# Patient Record
Sex: FEMALE | Race: WHITE | NOT HISPANIC OR LATINO | Employment: PART TIME | URBAN - METROPOLITAN AREA
[De-identification: names, ages, dates, MRNs, and addresses within clinical notes are randomized per-mention and may not be internally consistent; named-entity substitution may affect disease eponyms.]

---

## 2017-12-17 ENCOUNTER — HOSPITAL ENCOUNTER (INPATIENT)
Facility: HOSPITAL | Age: 82
LOS: 5 days | Discharge: HOME HEALTH | End: 2017-12-22
Attending: EMERGENCY MEDICINE | Admitting: HOSPITALIST

## 2017-12-17 ENCOUNTER — APPOINTMENT (OUTPATIENT)
Dept: GENERAL RADIOLOGY | Facility: HOSPITAL | Age: 82
End: 2017-12-17

## 2017-12-17 DIAGNOSIS — I48.0 PAF (PAROXYSMAL ATRIAL FIBRILLATION) (HCC): ICD-10-CM

## 2017-12-17 DIAGNOSIS — E55.9 VITAMIN D DEFICIENCY: ICD-10-CM

## 2017-12-17 DIAGNOSIS — R53.1 GENERALIZED WEAKNESS: ICD-10-CM

## 2017-12-17 DIAGNOSIS — J44.9 CHRONIC OBSTRUCTIVE PULMONARY DISEASE, UNSPECIFIED COPD TYPE (HCC): ICD-10-CM

## 2017-12-17 DIAGNOSIS — Z74.09 IMPAIRED FUNCTIONAL MOBILITY, BALANCE, GAIT, AND ENDURANCE: ICD-10-CM

## 2017-12-17 DIAGNOSIS — I11.9 HYPERTENSIVE HEART DISEASE WITHOUT HEART FAILURE: ICD-10-CM

## 2017-12-17 DIAGNOSIS — N39.0 URINARY TRACT INFECTION WITHOUT HEMATURIA, SITE UNSPECIFIED: Primary | ICD-10-CM

## 2017-12-17 DIAGNOSIS — D72.829 LEUKOCYTOSIS, UNSPECIFIED TYPE: ICD-10-CM

## 2017-12-17 DIAGNOSIS — J44.1 COPD EXACERBATION (HCC): ICD-10-CM

## 2017-12-17 DIAGNOSIS — N17.9 ACUTE RENAL FAILURE, UNSPECIFIED ACUTE RENAL FAILURE TYPE (HCC): ICD-10-CM

## 2017-12-17 DIAGNOSIS — R06.02 SHORTNESS OF BREATH: ICD-10-CM

## 2017-12-17 PROBLEM — R53.81 MALAISE: Status: ACTIVE | Noted: 2017-12-17

## 2017-12-17 PROBLEM — Z87.891 FORMER SMOKER: Status: ACTIVE | Noted: 2017-12-17

## 2017-12-17 PROBLEM — A41.9 SEPSIS (HCC): Status: ACTIVE | Noted: 2017-12-17

## 2017-12-17 PROBLEM — E11.9 DIABETES MELLITUS (HCC): Status: ACTIVE | Noted: 2017-12-17

## 2017-12-17 PROBLEM — J11.1 INFLUENZA: Status: ACTIVE | Noted: 2017-12-17

## 2017-12-17 PROBLEM — R54 ADVANCED AGE: Status: ACTIVE | Noted: 2017-12-17

## 2017-12-17 LAB
ALBUMIN SERPL-MCNC: 4.1 G/DL (ref 3.2–4.8)
ALBUMIN/GLOB SERPL: 1.6 G/DL (ref 1.5–2.5)
ALP SERPL-CCNC: 56 U/L (ref 25–100)
ALT SERPL W P-5'-P-CCNC: 20 U/L (ref 7–40)
ANION GAP SERPL CALCULATED.3IONS-SCNC: 11 MMOL/L (ref 3–11)
AST SERPL-CCNC: 25 U/L (ref 0–33)
BACTERIA UR QL AUTO: ABNORMAL /HPF
BASOPHILS # BLD AUTO: 0.02 10*3/MM3 (ref 0–0.2)
BASOPHILS NFR BLD AUTO: 0.1 % (ref 0–1)
BILIRUB SERPL-MCNC: 1.1 MG/DL (ref 0.3–1.2)
BILIRUB UR QL STRIP: ABNORMAL
BNP SERPL-MCNC: 318 PG/ML (ref 0–100)
BUN BLD-MCNC: 28 MG/DL (ref 9–23)
BUN/CREAT SERPL: 18.7 (ref 7–25)
CALCIUM SPEC-SCNC: 9.4 MG/DL (ref 8.7–10.4)
CHLORIDE SERPL-SCNC: 96 MMOL/L (ref 99–109)
CLARITY UR: ABNORMAL
CO2 SERPL-SCNC: 26 MMOL/L (ref 20–31)
COLOR UR: ABNORMAL
CREAT BLD-MCNC: 1.5 MG/DL (ref 0.6–1.3)
DEPRECATED RDW RBC AUTO: 51.7 FL (ref 37–54)
EOSINOPHIL # BLD AUTO: 0.01 10*3/MM3 (ref 0–0.3)
EOSINOPHIL NFR BLD AUTO: 0.1 % (ref 0–3)
ERYTHROCYTE [DISTWIDTH] IN BLOOD BY AUTOMATED COUNT: 17 % (ref 11.3–14.5)
FLUAV AG NPH QL: NEGATIVE
FLUBV AG NPH QL IA: NEGATIVE
GFR SERPL CREATININE-BSD FRML MDRD: 33 ML/MIN/1.73
GLOBULIN UR ELPH-MCNC: 2.6 GM/DL
GLUCOSE BLD-MCNC: 149 MG/DL (ref 70–100)
GLUCOSE BLDC GLUCOMTR-MCNC: 221 MG/DL (ref 70–130)
GLUCOSE UR STRIP-MCNC: NEGATIVE MG/DL
HCT VFR BLD AUTO: 38.2 % (ref 34.5–44)
HGB BLD-MCNC: 11.9 G/DL (ref 11.5–15.5)
HGB UR QL STRIP.AUTO: ABNORMAL
HOLD SPECIMEN: NORMAL
HOLD SPECIMEN: NORMAL
HYALINE CASTS UR QL AUTO: ABNORMAL /LPF
IMM GRANULOCYTES # BLD: 0.05 10*3/MM3 (ref 0–0.03)
IMM GRANULOCYTES NFR BLD: 0.3 % (ref 0–0.6)
KETONES UR QL STRIP: ABNORMAL
LEUKOCYTE ESTERASE UR QL STRIP.AUTO: ABNORMAL
LYMPHOCYTES # BLD AUTO: 2.23 10*3/MM3 (ref 0.6–4.8)
LYMPHOCYTES NFR BLD AUTO: 14.2 % (ref 24–44)
MCH RBC QN AUTO: 25.8 PG (ref 27–31)
MCHC RBC AUTO-ENTMCNC: 31.2 G/DL (ref 32–36)
MCV RBC AUTO: 82.7 FL (ref 80–99)
MONOCYTES # BLD AUTO: 0.79 10*3/MM3 (ref 0–1)
MONOCYTES NFR BLD AUTO: 5 % (ref 0–12)
NEUTROPHILS # BLD AUTO: 12.65 10*3/MM3 (ref 1.5–8.3)
NEUTROPHILS NFR BLD AUTO: 80.3 % (ref 41–71)
NITRITE UR QL STRIP: POSITIVE
PH UR STRIP.AUTO: 5.5 [PH] (ref 5–8)
PLATELET # BLD AUTO: 188 10*3/MM3 (ref 150–450)
PMV BLD AUTO: 10 FL (ref 6–12)
POTASSIUM BLD-SCNC: 4.3 MMOL/L (ref 3.5–5.5)
PROT SERPL-MCNC: 6.7 G/DL (ref 5.7–8.2)
PROT UR QL STRIP: ABNORMAL
RBC # BLD AUTO: 4.62 10*6/MM3 (ref 3.89–5.14)
RBC # UR: ABNORMAL /HPF
REF LAB TEST METHOD: ABNORMAL
SODIUM BLD-SCNC: 133 MMOL/L (ref 132–146)
SP GR UR STRIP: 1.02 (ref 1–1.03)
SQUAMOUS #/AREA URNS HPF: ABNORMAL /HPF
TROPONIN I SERPL-MCNC: 0.03 NG/ML (ref 0–0.07)
UROBILINOGEN UR QL STRIP: ABNORMAL
WBC NRBC COR # BLD: 15.75 10*3/MM3 (ref 3.5–10.8)
WBC UR QL AUTO: ABNORMAL /HPF
WHOLE BLOOD HOLD SPECIMEN: NORMAL
WHOLE BLOOD HOLD SPECIMEN: NORMAL
YEAST URNS QL MICRO: ABNORMAL /HPF

## 2017-12-17 PROCEDURE — 63710000001 INSULIN LISPRO (HUMAN) PER 5 UNITS: Performed by: FAMILY MEDICINE

## 2017-12-17 PROCEDURE — 80053 COMPREHEN METABOLIC PANEL: CPT | Performed by: EMERGENCY MEDICINE

## 2017-12-17 PROCEDURE — 81001 URINALYSIS AUTO W/SCOPE: CPT | Performed by: PHYSICIAN ASSISTANT

## 2017-12-17 PROCEDURE — 25010000002 METHYLPREDNISOLONE PER 125 MG: Performed by: FAMILY MEDICINE

## 2017-12-17 PROCEDURE — 99223 1ST HOSP IP/OBS HIGH 75: CPT | Performed by: FAMILY MEDICINE

## 2017-12-17 PROCEDURE — 25010000002 METHYLPREDNISOLONE PER 40 MG: Performed by: PHYSICIAN ASSISTANT

## 2017-12-17 PROCEDURE — 87086 URINE CULTURE/COLONY COUNT: CPT | Performed by: PHYSICIAN ASSISTANT

## 2017-12-17 PROCEDURE — 87804 INFLUENZA ASSAY W/OPTIC: CPT | Performed by: PHYSICIAN ASSISTANT

## 2017-12-17 PROCEDURE — 94640 AIRWAY INHALATION TREATMENT: CPT

## 2017-12-17 PROCEDURE — 85025 COMPLETE CBC W/AUTO DIFF WBC: CPT | Performed by: EMERGENCY MEDICINE

## 2017-12-17 PROCEDURE — 93005 ELECTROCARDIOGRAM TRACING: CPT | Performed by: EMERGENCY MEDICINE

## 2017-12-17 PROCEDURE — 82962 GLUCOSE BLOOD TEST: CPT

## 2017-12-17 PROCEDURE — 99285 EMERGENCY DEPT VISIT HI MDM: CPT

## 2017-12-17 PROCEDURE — 71010 HC CHEST PA OR AP: CPT

## 2017-12-17 PROCEDURE — 84484 ASSAY OF TROPONIN QUANT: CPT

## 2017-12-17 PROCEDURE — 87186 SC STD MICRODIL/AGAR DIL: CPT | Performed by: PHYSICIAN ASSISTANT

## 2017-12-17 PROCEDURE — 25010000002 ENOXAPARIN PER 10 MG: Performed by: FAMILY MEDICINE

## 2017-12-17 PROCEDURE — 87502 INFLUENZA DNA AMP PROBE: CPT | Performed by: FAMILY MEDICINE

## 2017-12-17 PROCEDURE — 87077 CULTURE AEROBIC IDENTIFY: CPT | Performed by: PHYSICIAN ASSISTANT

## 2017-12-17 PROCEDURE — 83880 ASSAY OF NATRIURETIC PEPTIDE: CPT | Performed by: EMERGENCY MEDICINE

## 2017-12-17 PROCEDURE — 25010000002 CEFTRIAXONE PER 250 MG: Performed by: PHYSICIAN ASSISTANT

## 2017-12-17 RX ORDER — SOLIFENACIN SUCCINATE 5 MG/1
5 TABLET, FILM COATED ORAL DAILY
Status: DISCONTINUED | OUTPATIENT
Start: 2017-12-17 | End: 2017-12-18

## 2017-12-17 RX ORDER — BUDESONIDE 0.5 MG/2ML
0.5 INHALANT ORAL
COMMUNITY
End: 2018-06-21

## 2017-12-17 RX ORDER — BISOPROLOL FUMARATE 5 MG/1
2.5 TABLET, FILM COATED ORAL DAILY
Status: DISCONTINUED | OUTPATIENT
Start: 2017-12-17 | End: 2017-12-19

## 2017-12-17 RX ORDER — MONTELUKAST SODIUM 10 MG/1
10 TABLET ORAL NIGHTLY
COMMUNITY

## 2017-12-17 RX ORDER — MONTELUKAST SODIUM 10 MG/1
10 TABLET ORAL NIGHTLY
Status: DISCONTINUED | OUTPATIENT
Start: 2017-12-17 | End: 2017-12-22 | Stop reason: HOSPADM

## 2017-12-17 RX ORDER — ASPIRIN 81 MG/1
81 TABLET ORAL DAILY
Status: DISCONTINUED | OUTPATIENT
Start: 2017-12-17 | End: 2017-12-22 | Stop reason: HOSPADM

## 2017-12-17 RX ORDER — DEXTROSE MONOHYDRATE 25 G/50ML
25 INJECTION, SOLUTION INTRAVENOUS
Status: DISCONTINUED | OUTPATIENT
Start: 2017-12-17 | End: 2017-12-22 | Stop reason: HOSPADM

## 2017-12-17 RX ORDER — ROSUVASTATIN CALCIUM 5 MG/1
5 TABLET, COATED ORAL DAILY
COMMUNITY

## 2017-12-17 RX ORDER — SODIUM CHLORIDE 9 MG/ML
100 INJECTION, SOLUTION INTRAVENOUS CONTINUOUS
Status: ACTIVE | OUTPATIENT
Start: 2017-12-17 | End: 2017-12-18

## 2017-12-17 RX ORDER — SODIUM CHLORIDE 0.9 % (FLUSH) 0.9 %
1-10 SYRINGE (ML) INJECTION AS NEEDED
Status: DISCONTINUED | OUTPATIENT
Start: 2017-12-17 | End: 2017-12-22 | Stop reason: HOSPADM

## 2017-12-17 RX ORDER — NICOTINE POLACRILEX 4 MG
15 LOZENGE BUCCAL
Status: DISCONTINUED | OUTPATIENT
Start: 2017-12-17 | End: 2017-12-22 | Stop reason: HOSPADM

## 2017-12-17 RX ORDER — ARFORMOTEROL TARTRATE 15 UG/2ML
15 SOLUTION RESPIRATORY (INHALATION)
Status: DISCONTINUED | OUTPATIENT
Start: 2017-12-17 | End: 2017-12-22 | Stop reason: HOSPADM

## 2017-12-17 RX ORDER — ROSUVASTATIN CALCIUM 10 MG/1
5 TABLET, COATED ORAL NIGHTLY
Status: DISCONTINUED | OUTPATIENT
Start: 2017-12-17 | End: 2017-12-22 | Stop reason: HOSPADM

## 2017-12-17 RX ORDER — BISOPROLOL FUMARATE 5 MG/1
2.5 TABLET, FILM COATED ORAL DAILY
Status: ON HOLD | COMMUNITY
End: 2017-12-22

## 2017-12-17 RX ORDER — CEFTRIAXONE SODIUM 1 G/50ML
1 INJECTION, SOLUTION INTRAVENOUS ONCE
Status: COMPLETED | OUTPATIENT
Start: 2017-12-17 | End: 2017-12-17

## 2017-12-17 RX ORDER — BUDESONIDE 0.5 MG/2ML
0.5 INHALANT ORAL
Status: DISCONTINUED | OUTPATIENT
Start: 2017-12-17 | End: 2017-12-22 | Stop reason: HOSPADM

## 2017-12-17 RX ORDER — GLIMEPIRIDE 1 MG/1
1 TABLET ORAL
COMMUNITY
End: 2018-06-21

## 2017-12-17 RX ORDER — OSELTAMIVIR PHOSPHATE 75 MG/1
75 CAPSULE ORAL ONCE
Status: COMPLETED | OUTPATIENT
Start: 2017-12-17 | End: 2017-12-17

## 2017-12-17 RX ORDER — METHYLPREDNISOLONE SODIUM SUCCINATE 125 MG/2ML
60 INJECTION, POWDER, LYOPHILIZED, FOR SOLUTION INTRAMUSCULAR; INTRAVENOUS EVERY 12 HOURS
Status: COMPLETED | OUTPATIENT
Start: 2017-12-17 | End: 2017-12-18

## 2017-12-17 RX ORDER — ONDANSETRON 2 MG/ML
4 INJECTION INTRAMUSCULAR; INTRAVENOUS EVERY 6 HOURS PRN
Status: DISCONTINUED | OUTPATIENT
Start: 2017-12-17 | End: 2017-12-22 | Stop reason: HOSPADM

## 2017-12-17 RX ORDER — OSELTAMIVIR PHOSPHATE 30 MG/1
30 CAPSULE ORAL EVERY 24 HOURS
Status: DISCONTINUED | OUTPATIENT
Start: 2017-12-18 | End: 2017-12-22 | Stop reason: HOSPADM

## 2017-12-17 RX ORDER — IPRATROPIUM BROMIDE AND ALBUTEROL SULFATE 2.5; .5 MG/3ML; MG/3ML
3 SOLUTION RESPIRATORY (INHALATION)
Status: DISCONTINUED | OUTPATIENT
Start: 2017-12-17 | End: 2017-12-17

## 2017-12-17 RX ORDER — MELATONIN
1000 DAILY
Status: DISCONTINUED | OUTPATIENT
Start: 2017-12-18 | End: 2017-12-22 | Stop reason: HOSPADM

## 2017-12-17 RX ORDER — IPRATROPIUM BROMIDE AND ALBUTEROL SULFATE 2.5; .5 MG/3ML; MG/3ML
3 SOLUTION RESPIRATORY (INHALATION) ONCE
Status: COMPLETED | OUTPATIENT
Start: 2017-12-17 | End: 2017-12-17

## 2017-12-17 RX ORDER — METHYLPREDNISOLONE SODIUM SUCCINATE 40 MG/ML
80 INJECTION, POWDER, LYOPHILIZED, FOR SOLUTION INTRAMUSCULAR; INTRAVENOUS ONCE
Status: COMPLETED | OUTPATIENT
Start: 2017-12-17 | End: 2017-12-17

## 2017-12-17 RX ORDER — MELATONIN
1000 DAILY
COMMUNITY

## 2017-12-17 RX ORDER — ACETAMINOPHEN 325 MG/1
650 TABLET ORAL EVERY 4 HOURS PRN
Status: DISCONTINUED | OUTPATIENT
Start: 2017-12-17 | End: 2017-12-22 | Stop reason: HOSPADM

## 2017-12-17 RX ORDER — SODIUM CHLORIDE 0.9 % (FLUSH) 0.9 %
10 SYRINGE (ML) INJECTION AS NEEDED
Status: DISCONTINUED | OUTPATIENT
Start: 2017-12-17 | End: 2017-12-22 | Stop reason: HOSPADM

## 2017-12-17 RX ORDER — CEFTRIAXONE SODIUM 1 G/50ML
1 INJECTION, SOLUTION INTRAVENOUS EVERY 24 HOURS
Status: COMPLETED | OUTPATIENT
Start: 2017-12-18 | End: 2017-12-20

## 2017-12-17 RX ORDER — CORTISONE ACETATE 25 MG/1
25 TABLET ORAL 2 TIMES DAILY
Status: DISCONTINUED | OUTPATIENT
Start: 2017-12-17 | End: 2017-12-22 | Stop reason: HOSPADM

## 2017-12-17 RX ORDER — PANTOPRAZOLE SODIUM 40 MG/1
40 TABLET, DELAYED RELEASE ORAL NIGHTLY
Status: DISCONTINUED | OUTPATIENT
Start: 2017-12-17 | End: 2017-12-22 | Stop reason: HOSPADM

## 2017-12-17 RX ADMIN — CORTISONE ACETATE 25 MG: 25 TABLET ORAL at 22:02

## 2017-12-17 RX ADMIN — ENOXAPARIN SODIUM 30 MG: 30 INJECTION SUBCUTANEOUS at 22:04

## 2017-12-17 RX ADMIN — SOLIFENACIN SUCCINATE 5 MG: 5 TABLET, FILM COATED ORAL at 22:03

## 2017-12-17 RX ADMIN — SODIUM CHLORIDE 100 ML/HR: 9 INJECTION, SOLUTION INTRAVENOUS at 22:16

## 2017-12-17 RX ADMIN — METHYLPREDNISOLONE SODIUM SUCCINATE 60 MG: 125 INJECTION, POWDER, FOR SOLUTION INTRAMUSCULAR; INTRAVENOUS at 22:03

## 2017-12-17 RX ADMIN — PANTOPRAZOLE SODIUM 40 MG: 40 TABLET, DELAYED RELEASE ORAL at 22:01

## 2017-12-17 RX ADMIN — OSELTAMIVIR PHOSPHATE 75 MG: 75 CAPSULE ORAL at 22:00

## 2017-12-17 RX ADMIN — CEFTRIAXONE SODIUM 1 G: 1 INJECTION, SOLUTION INTRAVENOUS at 18:11

## 2017-12-17 RX ADMIN — MONTELUKAST SODIUM 10 MG: 10 TABLET, FILM COATED ORAL at 22:01

## 2017-12-17 RX ADMIN — ROSUVASTATIN CALCIUM 5 MG: 10 TABLET, FILM COATED ORAL at 21:59

## 2017-12-17 RX ADMIN — INSULIN LISPRO 3 UNITS: 100 INJECTION, SOLUTION INTRAVENOUS; SUBCUTANEOUS at 22:06

## 2017-12-17 RX ADMIN — METHYLPREDNISOLONE SODIUM SUCCINATE 80 MG: 40 INJECTION, POWDER, FOR SOLUTION INTRAMUSCULAR; INTRAVENOUS at 18:08

## 2017-12-17 RX ADMIN — SODIUM CHLORIDE 1000 ML: 9 INJECTION, SOLUTION INTRAVENOUS at 15:58

## 2017-12-17 RX ADMIN — IPRATROPIUM BROMIDE AND ALBUTEROL SULFATE 3 ML: .5; 3 SOLUTION RESPIRATORY (INHALATION) at 16:05

## 2017-12-17 NOTE — ED PROVIDER NOTES
"Subjective   HPI Comments: 89-year-old female presents to the emergency department with complaints of shortness of breath, cough, generalized weakness and low-grade fever.  She has had some mild nausea without vomiting.  Her symptoms have been ongoing for about 2 days.  The patient has a history of COPD but is not on any oxygen at home..  She is a former smoker but quit in 1975.  No alcohol or drug use.  She also has a history of adrenal insufficiency and \"prediabetes\".  Her PCP is Jessica Alex.  The patient lives alone.    Patient is a 89 y.o. female presenting with shortness of breath.   History provided by:  Patient  Shortness of Breath   Severity:  Moderate  Onset quality:  Gradual  Duration:  2 days  Timing:  Constant  Progression:  Worsening  Chronicity:  New  Relieved by:  Nothing  Worsened by:  Activity  Ineffective treatments: nebulizer.  Associated symptoms: cough and fever    Associated symptoms: no abdominal pain, no chest pain, no diaphoresis, no ear pain, no headaches, no rash, no sore throat, no vomiting and no wheezing        Review of Systems   Constitutional: Positive for chills and fever. Negative for diaphoresis.   HENT: Negative for congestion, ear pain, nosebleeds, rhinorrhea and sore throat.    Eyes: Negative for pain, discharge and visual disturbance.   Respiratory: Positive for cough and shortness of breath. Negative for wheezing.    Cardiovascular: Negative for chest pain, palpitations and leg swelling.   Gastrointestinal: Negative for abdominal pain, blood in stool, diarrhea, nausea and vomiting.   Endocrine: Negative.    Genitourinary: Negative for dysuria, hematuria and urgency.   Musculoskeletal: Negative for arthralgias and back pain.   Skin: Negative for pallor and rash.   Allergic/Immunologic: Negative for immunocompromised state.   Neurological: Positive for weakness (generalized). Negative for dizziness, speech difficulty and headaches.   Hematological: Negative for adenopathy. " Does not bruise/bleed easily.   Psychiatric/Behavioral: Negative.        Past Medical History:   Diagnosis Date   • Adrenal insufficiency    • Atypical pneumonia     Spring 2013-Diagnosed approximately 6 weeks ago with increasing shortness of breath, pleuritic chest pain, weakness and fatigue b.Two rounds of antibiotics with slow improvement, July 2013.    • COPD (chronic obstructive pulmonary disease)    • Corticoadrenal insufficiency     on chronic prednisone therapy   • Diabetes mellitus     Type 2   • Dyslipidemia    • GERD (gastroesophageal reflux disease)    • Hypertensive cardiovascular disease    • Hyperuricemia      with intermittent gout   • Kidney disease     chronic- stage 3   • ADALGISA (obstructive sleep apnea)     with CPAP use   • Osteoarthritis    • Osteopenia    • Peripheral neuropathy    • Peripheral vascular disease    • Seizures    • Vasovagal syncope     with positive tilt table test, data deficit   • Vitamin D deficiency        Allergies   Allergen Reactions   • Sulfa Antibiotics Anaphylaxis   • Bactrim [Sulfamethoxazole-Trimethoprim] Hives   • Dilantin [Phenytoin Sodium Extended]    • Keflex [Cephalexin] GI Intolerance   • Fludrocortisone Acetate [Fludrocortisone] Palpitations     Hypertension  Edema     • Phenytoin Rash       Past Surgical History:   Procedure Laterality Date   • BREAST SURGERY Right 2007    Breast biopsy   • CATARACT EXTRACTION Bilateral        Family History   Problem Relation Age of Onset   • Heart disease Father    • Hypertension Father        Social History     Social History   • Marital status:      Spouse name: N/A   • Number of children: N/A   • Years of education: N/A     Social History Main Topics   • Smoking status: Never Smoker   • Smokeless tobacco: None   • Alcohol use Yes      Comment: socially    • Drug use: No   • Sexual activity: Defer     Other Topics Concern   • None     Social History Narrative           Objective   Physical Exam   Constitutional: She  is oriented to person, place, and time. She appears well-developed and well-nourished. No distress.   HENT:   Head: Normocephalic and atraumatic.   Nose: Nose normal.   Mouth/Throat: Oropharynx is clear and moist.   Eyes: EOM are normal. Pupils are equal, round, and reactive to light. No scleral icterus.   Neck: Normal range of motion. Neck supple.   Cardiovascular: Normal rate, regular rhythm, normal heart sounds and intact distal pulses.    No murmur heard.  Pulmonary/Chest: Effort normal. No respiratory distress. She has wheezes (mild). She has no rales. She exhibits no tenderness.   Abdominal: Soft. Bowel sounds are normal. There is no tenderness. There is no rebound and no guarding.   Musculoskeletal: Normal range of motion. She exhibits no edema or tenderness.   Neurological: She is alert and oriented to person, place, and time.   Skin: Skin is warm and dry. No rash noted. She is not diaphoretic.   Psychiatric: She has a normal mood and affect.   Nursing note and vitals reviewed.      Procedures         ED Course  ED Course    Pt is some better after neb tx but still has a nasty cough.  Her chest xray is read as chronic changes, nothing acute.  Her influenza screen is negative.  Pt has a UTI as well.  Her white count is 15K.  She also has mild renal failure with creatinine at 1.5.  (was 1.1 a year ago).  Will plan to admit.              [facility]    Final diagnoses:   Urinary tract infection without hematuria, site unspecified   COPD exacerbation   Shortness of breath   Generalized weakness   Leukocytosis, unspecified type   Acute renal failure, unspecified acute renal failure type            ANA LAURA Escobar  12/17/17 0971

## 2017-12-18 LAB
ANION GAP SERPL CALCULATED.3IONS-SCNC: 11 MMOL/L (ref 3–11)
BUN BLD-MCNC: 28 MG/DL (ref 9–23)
BUN/CREAT SERPL: 23.3 (ref 7–25)
CALCIUM SPEC-SCNC: 7.9 MG/DL (ref 8.7–10.4)
CHLORIDE SERPL-SCNC: 101 MMOL/L (ref 99–109)
CO2 SERPL-SCNC: 22 MMOL/L (ref 20–31)
CREAT BLD-MCNC: 1.2 MG/DL (ref 0.6–1.3)
DEPRECATED RDW RBC AUTO: 52.8 FL (ref 37–54)
ERYTHROCYTE [DISTWIDTH] IN BLOOD BY AUTOMATED COUNT: 17.2 % (ref 11.3–14.5)
FLUAV SUBTYP SPEC NAA+PROBE: DETECTED
FLUBV RNA ISLT QL NAA+PROBE: NOT DETECTED
GFR SERPL CREATININE-BSD FRML MDRD: 42 ML/MIN/1.73
GLUCOSE BLD-MCNC: 299 MG/DL (ref 70–100)
GLUCOSE BLDC GLUCOMTR-MCNC: 228 MG/DL (ref 70–130)
GLUCOSE BLDC GLUCOMTR-MCNC: 261 MG/DL (ref 70–130)
GLUCOSE BLDC GLUCOMTR-MCNC: 285 MG/DL (ref 70–130)
GLUCOSE BLDC GLUCOMTR-MCNC: 344 MG/DL (ref 70–130)
HBA1C MFR BLD: 6.5 % (ref 4.8–5.6)
HCT VFR BLD AUTO: 33.5 % (ref 34.5–44)
HGB BLD-MCNC: 10.1 G/DL (ref 11.5–15.5)
MCH RBC QN AUTO: 25.4 PG (ref 27–31)
MCHC RBC AUTO-ENTMCNC: 30.1 G/DL (ref 32–36)
MCV RBC AUTO: 84.4 FL (ref 80–99)
PLATELET # BLD AUTO: 165 10*3/MM3 (ref 150–450)
PMV BLD AUTO: 10.6 FL (ref 6–12)
POTASSIUM BLD-SCNC: 4.5 MMOL/L (ref 3.5–5.5)
RBC # BLD AUTO: 3.97 10*6/MM3 (ref 3.89–5.14)
SODIUM BLD-SCNC: 134 MMOL/L (ref 132–146)
WBC NRBC COR # BLD: 11.16 10*3/MM3 (ref 3.5–10.8)

## 2017-12-18 PROCEDURE — 25010000002 CEFTRIAXONE PER 250 MG: Performed by: HOSPITALIST

## 2017-12-18 PROCEDURE — 25010000002 METHYLPREDNISOLONE PER 125 MG: Performed by: FAMILY MEDICINE

## 2017-12-18 PROCEDURE — 85027 COMPLETE CBC AUTOMATED: CPT | Performed by: FAMILY MEDICINE

## 2017-12-18 PROCEDURE — 82962 GLUCOSE BLOOD TEST: CPT

## 2017-12-18 PROCEDURE — 94799 UNLISTED PULMONARY SVC/PX: CPT

## 2017-12-18 PROCEDURE — 80048 BASIC METABOLIC PNL TOTAL CA: CPT | Performed by: FAMILY MEDICINE

## 2017-12-18 PROCEDURE — 99232 SBSQ HOSP IP/OBS MODERATE 35: CPT | Performed by: HOSPITALIST

## 2017-12-18 PROCEDURE — 25010000002 ENOXAPARIN PER 10 MG: Performed by: FAMILY MEDICINE

## 2017-12-18 PROCEDURE — 63710000001 INSULIN DETEMIR PER 5 UNITS: Performed by: HOSPITALIST

## 2017-12-18 PROCEDURE — 94640 AIRWAY INHALATION TREATMENT: CPT

## 2017-12-18 PROCEDURE — 83036 HEMOGLOBIN GLYCOSYLATED A1C: CPT | Performed by: HOSPITALIST

## 2017-12-18 RX ORDER — FUROSEMIDE 10 MG/ML
20 INJECTION INTRAMUSCULAR; INTRAVENOUS ONCE
Status: COMPLETED | OUTPATIENT
Start: 2017-12-19 | End: 2017-12-18

## 2017-12-18 RX ORDER — FUROSEMIDE 20 MG/1
20-40 TABLET ORAL DAILY PRN
COMMUNITY
End: 2017-12-22 | Stop reason: HOSPADM

## 2017-12-18 RX ORDER — ECHINACEA PURPUREA EXTRACT 125 MG
1 TABLET ORAL AS NEEDED
Status: DISCONTINUED | OUTPATIENT
Start: 2017-12-18 | End: 2017-12-22 | Stop reason: HOSPADM

## 2017-12-18 RX ORDER — SOLIFENACIN SUCCINATE 5 MG/1
5 TABLET, FILM COATED ORAL NIGHTLY
Status: DISCONTINUED | OUTPATIENT
Start: 2017-12-18 | End: 2017-12-22 | Stop reason: HOSPADM

## 2017-12-18 RX ADMIN — ENOXAPARIN SODIUM 30 MG: 30 INJECTION SUBCUTANEOUS at 21:25

## 2017-12-18 RX ADMIN — PANTOPRAZOLE SODIUM 40 MG: 40 TABLET, DELAYED RELEASE ORAL at 21:25

## 2017-12-18 RX ADMIN — BUDESONIDE 0.5 MG: 0.5 INHALANT RESPIRATORY (INHALATION) at 10:21

## 2017-12-18 RX ADMIN — MONTELUKAST SODIUM 10 MG: 10 TABLET, FILM COATED ORAL at 21:26

## 2017-12-18 RX ADMIN — CORTISONE ACETATE 25 MG: 25 TABLET ORAL at 17:46

## 2017-12-18 RX ADMIN — SOLIFENACIN SUCCINATE 5 MG: 5 TABLET, FILM COATED ORAL at 21:25

## 2017-12-18 RX ADMIN — IPRATROPIUM BROMIDE 0.5 MG: 0.5 SOLUTION RESPIRATORY (INHALATION) at 10:21

## 2017-12-18 RX ADMIN — CEFTRIAXONE SODIUM 1 G: 1 INJECTION, SOLUTION INTRAVENOUS at 17:33

## 2017-12-18 RX ADMIN — OSELTAMIVIR PHOSPHATE 30 MG: 30 CAPSULE ORAL at 21:26

## 2017-12-18 RX ADMIN — METHYLPREDNISOLONE SODIUM SUCCINATE 60 MG: 125 INJECTION, POWDER, FOR SOLUTION INTRAMUSCULAR; INTRAVENOUS at 09:04

## 2017-12-18 RX ADMIN — INSULIN DETEMIR 10 UNITS: 100 INJECTION, SOLUTION SUBCUTANEOUS at 09:04

## 2017-12-18 RX ADMIN — SALINE NASAL SPRAY 1 SPRAY: 1.5 SOLUTION NASAL at 23:35

## 2017-12-18 RX ADMIN — FUROSEMIDE 20 MG: 10 INJECTION, SOLUTION INTRAMUSCULAR; INTRAVENOUS at 23:35

## 2017-12-18 RX ADMIN — ARFORMOTEROL TARTRATE 15 MCG: 15 SOLUTION RESPIRATORY (INHALATION) at 20:56

## 2017-12-18 RX ADMIN — ROSUVASTATIN CALCIUM 5 MG: 10 TABLET, FILM COATED ORAL at 21:25

## 2017-12-18 RX ADMIN — CORTISONE ACETATE 25 MG: 25 TABLET ORAL at 09:05

## 2017-12-18 RX ADMIN — METHYLPREDNISOLONE SODIUM SUCCINATE 60 MG: 125 INJECTION, POWDER, FOR SOLUTION INTRAMUSCULAR; INTRAVENOUS at 21:31

## 2017-12-18 RX ADMIN — ASPIRIN 81 MG: 81 TABLET, COATED ORAL at 09:05

## 2017-12-18 RX ADMIN — IPRATROPIUM BROMIDE 0.5 MG: 0.5 SOLUTION RESPIRATORY (INHALATION) at 20:56

## 2017-12-18 RX ADMIN — INSULIN LISPRO 5 UNITS: 100 INJECTION, SOLUTION INTRAVENOUS; SUBCUTANEOUS at 11:30

## 2017-12-18 RX ADMIN — INSULIN LISPRO 4 UNITS: 100 INJECTION, SOLUTION INTRAVENOUS; SUBCUTANEOUS at 09:06

## 2017-12-18 RX ADMIN — IPRATROPIUM BROMIDE 0.5 MG: 0.5 SOLUTION RESPIRATORY (INHALATION) at 13:48

## 2017-12-18 RX ADMIN — IPRATROPIUM BROMIDE 0.5 MG: 0.5 SOLUTION RESPIRATORY (INHALATION) at 16:40

## 2017-12-18 RX ADMIN — VITAMIN D, TAB 1000IU (100/BT) 1000 UNITS: 25 TAB at 09:05

## 2017-12-18 RX ADMIN — POLYETHYLENE GLYCOL 3350 17 G: 17 POWDER, FOR SOLUTION ORAL at 11:30

## 2017-12-18 RX ADMIN — ARFORMOTEROL TARTRATE 15 MCG: 15 SOLUTION RESPIRATORY (INHALATION) at 10:31

## 2017-12-18 RX ADMIN — BISOPROLOL FUMARATE 2.5 MG: 5 TABLET ORAL at 09:05

## 2017-12-18 NOTE — PROGRESS NOTES
[facility] Medicine Services  PROGRESS NOTE    Patient Name: Ute Enrique  : 1926  MRN: [id number]    Date of Admission: 2017  Length of Stay: 1  Primary Care Physician: Jessica Alex MD    Subjective   Subjective     CC:  FU SOB    HPI:  Patient reports feeling well.  Still coughing with sputum production.  Breathing better.     Review of Systems  Gen- No fevers, chills  CV- No chest pain, palpitations  Resp- No cough, dyspnea  GI- No N/V/D, abd pain    Otherwise ROS is negative except as mentioned in the HPI.    Objective   Objective     Vital Signs:   Temp:  [98.3 °F (36.8 °C)-100.3 °F (37.9 °C)] 98.3 °F (36.8 °C)  Heart Rate:  [] 73  Resp:  [18-24] 18  BP: ()/(44-88) 119/66        Physical Exam:  Constitutional: No acute distress, awake, alert on RA  Eyes: PERRLA, sclerae anicteric, no conjunctival injection  HENT: NCAT, mucous membranes moist  Neck: Supple, no thyromegaly, no lymphadenopathy, trachea midline  Respiratory: Coarse breath sounds diffusely, nonlabored respirations   Cardiovascular: RRR, no murmurs, rubs, or gallops, palpable pedal pulses bilaterally  Gastrointestinal: Positive bowel sounds, soft, nontender, nondistended  Musculoskeletal: No bilateral ankle edema, no clubbing or cyanosis to extremities  Psychiatric: Appropriate affect, cooperative  Neurologic: Oriented x 3, strength symmetric in all extremities, Cranial Nerves grossly intact to confrontation, speech clear  Skin: No rashes    Results Reviewed:  I have personally reviewed current lab, radiology, and data and agree.      Results from last 7 days  Lab Units 17  0527 17  1503   WBC 10*3/mm3 11.16* 15.75*   HEMOGLOBIN g/dL 10.1* 11.9   HEMATOCRIT % 33.5* 38.2   PLATELETS 10*3/mm3 165 188       Results from last 7 days  Lab Units 17  0527 17  1503   SODIUM mmol/L 134 133   POTASSIUM mmol/L 4.5 4.3   CHLORIDE mmol/L 101 96*   CO2 mmol/L 22.0 26.0   BUN mg/dL  28* 28*   CREATININE mg/dL 1.20 1.50*   GLUCOSE mg/dL 299* 149*   CALCIUM mg/dL 7.9* 9.4   ALT (SGPT) U/L  --  20   AST (SGOT) U/L  --  25     BNP   Date Value Ref Range Status   12/17/2017 318.0 (H) 0.0 - 100.0 pg/mL Final     No results found for: PHART    Microbiology Results Abnormal     Procedure Component Value - Date/Time    Influenza Antigen, Rapid - Swab, Nasopharynx [[id number]]  (Normal) Collected:  12/17/17 1558    Lab Status:  Final result Specimen:  Swab from Nasopharynx Updated:  12/17/17 1627     Influenza A Ag, EIA Negative     Influenza B Ag, EIA Negative          Imaging Results (last 24 hours)     Procedure Component Value Units Date/Time    XR Chest 1 View [[id number]] Collected:  12/17/17 1650     Updated:  12/17/17 1708    Narrative:          EXAMINATION: XR CHEST, SINGLE VIEW - 12/17/2017     INDICATION: Shortness of air.     COMPARISON: 1/06/2016.     FINDINGS: There are postinflammatory changes in both upper lobes.  Cardiac silhouette is normal. There are also bibasilar postinflammatory  changes. There is no acute inflammatory process, mass or effusion.           Impression:       Chronic postinflammatory changes with no acute findings.     DICTATED:     12/17/2017  EDITED:          12/17/2017           This report was finalized on 12/17/2017 5:06 PM by Dr. Nabil Lynn MD.                I have reviewed the medications.    Assessment/Plan   Assessment / Plan     Hospital Problem List     * (Principal)Sepsis    Vitamin D deficiency    Corticoadrenal insufficiency    Overview Addendum 12/17/2017  8:11 PM by Irene Neal MD     on chronic steroids therapy         COPD (chronic obstructive pulmonary disease)    UTI (urinary tract infection)    Malaise    JANETT (acute kidney injury)    Advanced age    Diabetes mellitus    Overview Signed 12/17/2017  8:10 PM by Irene Neal MD     Type 2         Possible Influenza    Former smoker           Brief Hospital Course to date:  Ute Enrique is a 89 y.o.  female with PMHX significant for adrenal insufficiency on chronic steroids, remote smoker, COPD, ADALGISA with CPAP use, hypertension, type 2 diabetes, CKD    Assessment & Plan:  - Acute hypoxemic respiratory failure: Resolved. Currently back on RA  - Influenza A: Continue Tamiflu  - GNR UTI: Ceftriaxone  - JANETT on CKD: Baseline Cr 1.1-1.2.  Improving fluids  - Adrenal insufficiency on chronic steroids: Home cortisone 25mg  - COPD: Pulmicort, ipratropium nebs, methylpred  - ADALGISA: Home CPAP   - Hypertension: Home Bisoprolol  - T2DM: A1C pending, Levemir 10U + 5U TID + SSI    DVT Prophylaxis:  pLOV    CODE STATUS: Full Code    Disposition: I expect the patient to be discharged home in 1-2 days    Samira Anna MD  12/18/17  7:27 AM

## 2017-12-18 NOTE — PROGRESS NOTES
Discharge Planning Assessment  [facility]     Patient Name: Ute Enrique  MRN: [id number]  Today's Date: 12/18/2017    Admit Date: 12/17/2017          Discharge Needs Assessment       12/18/17 1408    Living Environment    Lives With alone    Living Arrangements house    Home Accessibility no concerns    Stair Railings at Home none    Type of Financial/Environmental Concern none    Transportation Available other (see comments)   pt will need transportation to home with DC'd, family lives out of town    Living Environment    Provides Primary Care For no one    Quality Of Family Relationships unable to assess    Able to Return to Prior Living Arrangements yes    Discharge Needs Assessment    Concerns To Be Addressed denies needs/concerns at this time;discharge planning concerns    Readmission Within The Last 30 Days no previous admission in last 30 days    Outpatient/Agency/Support Group Needs homecare agency (specify level of care)    Anticipated Changes Related to Illness none    Equipment Currently Used at Home bath bench;bipap/ cpap    Equipment Needed After Discharge none    Discharge Facility/Level Of Care Needs home with home health    Current Discharge Risk lives alone    Discharge Disposition still a patient            Discharge Plan       12/18/17 1413    Case Management/Social Work Plan    Plan Home with home health    Additional Comments Met with Ms. Enrique at the bedside, she reports having a st, cane, cpap and bath bench only, denies having any HH services.  She reports living alone and states her sons live in [place].  She is independant with ADL's.  She reports her medications and copays are affordable.  Her plan is to return home.  She is open to home health especially for physical therapy if recommended.  She will need transportation at time of DC, and has requested we use [facility] transport instead of [employer]. This  will arrange transportation when needed.  Cm will cont to follow.          Discharge Placement     No information found        Expected Discharge Date and Time     Expected Discharge Date Expected Discharge Time    Dec 21, 2017               Demographic Summary       12/18/17 1407    Referral Information    Admission Type inpatient    Arrived From home or self-care    Referral Source admission list    Reason For Consult discharge planning    Record Reviewed medical record    Primary Care Physician Information    Name Jessica Alex            Functional Status       12/18/17 1401    Functional Status Current    Current Functional Level Comment see nursing notes    Functional Status Prior    Ambulation 1-->assistive equipment    Transferring 1-->assistive equipment    Toileting 0-->independent    Bathing 0-->independent    Dressing 0-->independent    Eating 0-->independent    Communication 0-->understands/communicates without difficulty    Swallowing 0-->swallows foods/liquids without difficulty    IADL    Medications independent    Meal Preparation independent    Housekeeping independent    Laundry independent    Shopping independent    Oral Care independent    Activity Tolerance    Current Activity Limitations none    Usual Activity Tolerance moderate    Current Activity Tolerance moderate            Psychosocial     None            Abuse/Neglect     None            Legal     None            Substance Abuse     None            Patient Forms     None          Beverly Abraham RN

## 2017-12-18 NOTE — PLAN OF CARE
Problem: Patient Care Overview (Adult)  Goal: Plan of Care Review  Outcome: Ongoing (interventions implemented as appropriate)    12/18/17 0911   Outcome Evaluation   Outcome Summary/Follow up Plan Pt has been up in chair all of shift. No s/s of distress. RA. VSS. Will continue to monitor.        Goal: Adult Individualization and Mutuality  Outcome: Ongoing (interventions implemented as appropriate)  Goal: Discharge Needs Assessment  Outcome: Ongoing (interventions implemented as appropriate)    Problem: Sepsis (Adult)  Goal: Signs and Symptoms of Listed Potential Problems Will be Absent or Manageable (Sepsis)  Outcome: Ongoing (interventions implemented as appropriate)    Problem: Fall Risk (Adult)  Goal: Identify Related Risk Factors and Signs and Symptoms  Outcome: Ongoing (interventions implemented as appropriate)  Goal: Absence of Falls  Outcome: Ongoing (interventions implemented as appropriate)

## 2017-12-18 NOTE — H&P
[facility] Medicine Services  HISTORY AND PHYSICAL    Patient Name: Ute Enrique  : 1926  MRN: [id number]  Primary Care Physician: Jessica Alex MD    Subjective   Subjective     Chief Complaint:  Malaise, fever, congestion, SOA    HPI:  Ute Enrique is a 89 y.o. female appears younger than stated age with PMHX significant for adrenal insufficiencyOn chronic steroids, remote smoker, COPD, ADALGISA with CPAP use, hypertension, type 2 diabetes, CKDIII presents to ED with rashid onset of symtoms which started 12/15/17 and has escalated.  Complains of congestion, fever, malaise, body aches which has progressed to wheezing and BREEN in past 24hrs.  Denies abd pain, dysuria, CP, edema, known ill contacts.  ED evaluation significant for fever/hypoxia/tachycardia with leukocytosis and UTI.  Influenza swab was negative but patient's clinical picture is consistent with possible flu.     Review of Systems   Constitutional: Positive for chills, fatigue and fever.   HENT: Positive for congestion.    Respiratory: Positive for cough, shortness of breath and wheezing.    Gastrointestinal: Negative for abdominal pain.   Genitourinary: Negative for difficulty urinating and dysuria.   Musculoskeletal: Positive for myalgias.      Otherwise 10-system ROS reviewed and is negative except as mentioned in the HPI.    Personal History     Past Medical History:   Diagnosis Date   • Adrenal insufficiency    • Atypical pneumonia     Spring 2013-Diagnosed approximately 6 weeks ago with increasing shortness of breath, pleuritic chest pain, weakness and fatigue b.Two rounds of antibiotics with slow improvement, 2013.    • COPD (chronic obstructive pulmonary disease)    • Corticoadrenal insufficiency     on chronic prednisone therapy   • Diabetes mellitus     Type 2   • Dyslipidemia    • GERD (gastroesophageal reflux disease)    • Hypertensive cardiovascular disease    • Hyperuricemia      with intermittent  gout   • Kidney disease     chronic- stage 3   • ADALGISA (obstructive sleep apnea)     with CPAP use   • Osteoarthritis    • Osteopenia    • Peripheral neuropathy    • Peripheral vascular disease    • Seizures    • Vasovagal syncope     with positive tilt table test, data deficit   • Vitamin D deficiency        Past Surgical History:   Procedure Laterality Date   • BREAST SURGERY Right 2007    Breast biopsy   • CATARACT EXTRACTION Bilateral        Family History: family history includes Heart disease in her father; Hypertension in her father.     Social History:  reports that she has never smoked. She does not have any smokeless tobacco history on file. She reports that she drinks alcohol. She reports that she does not use illicit drugs.    Medications:  Prescriptions Prior to Admission   Medication Sig Dispense Refill Last Dose   • arformoterol (BROVANA) 15 MCG/2ML nebulizer solution Take 15 mcg by nebulization 2 (two) times a day.      • aspirin 81 MG EC tablet Take 81 mg by mouth daily.      • bisoprolol (ZEBeta) 5 MG tablet Take 2.5 mg by mouth Daily.      • budesonide (PULMICORT) 0.5 MG/2ML nebulizer solution Take 0.5 mg by nebulization Daily.      • cholecalciferol (VITAMIN D3) 1000 units tablet Take 1,000 Units by mouth Daily.      • cortisone (CORTONE) 25 MG tablet Take 25 mg by mouth 2 (Two) Times a Day.      • glimepiride (AMARYL) 1 MG tablet Take 1 mg by mouth Every Morning Before Breakfast.      • montelukast (SINGULAIR) 10 MG tablet Take 10 mg by mouth Every Night.      • pantoprazole (PROTONIX) 20 MG EC tablet Take 40 mg by mouth Every Night.      • rosuvastatin (CRESTOR) 5 MG tablet Take 5 mg by mouth Daily.      • sitaGLIPtin (JANUVIA) 100 MG tablet Take 100 mg by mouth daily.      • Solifenacin Succinate (VESICARE PO) Take 5 mg by mouth Daily.          Allergies   Allergen Reactions   • Sulfa Antibiotics Anaphylaxis   • Bactrim [Sulfamethoxazole-Trimethoprim] Hives   • Dilantin [Phenytoin Sodium  Extended]    • Keflex [Cephalexin] GI Intolerance   • Fludrocortisone Acetate [Fludrocortisone] Palpitations     Hypertension  Edema     • Phenytoin Rash       Objective   Objective     Vital Signs:   Temp:  [99.6 °F (37.6 °C)-100.3 °F (37.9 °C)] 99.6 °F (37.6 °C)  Heart Rate:  [] 92  Resp:  [18-24] 18  BP: ()/(44-88) 117/54        Physical Exam   Constitutional: Awake, alert, ill appearing  Eyes: PERRLA, sclerae anicteric,  conjunctival injection  HENT: NCAT, mucous membranes moist  Neck: Supple, no thyromegaly, no lymphadenopathy, trachea midline  Respiratory: dyspneic with conversation, wheezes throughout, wet upper congestion  Cardiovascular: RRR,tachy low 100's  Gastrointestinal: Positive bowel sounds, soft, nontender, nondistended  Musculoskeletal: No bilateral ankle edema, no clubbing or cyanosis to extremities  Psychiatric: Appropriate affect, cooperative  Skin: No rashes, noted to be flushed      Results Reviewed:  I have personally reviewed current lab, radiology, and data and agree.      Results from last 7 days  Lab Units 12/17/17  1503   WBC 10*3/mm3 15.75*   HEMOGLOBIN g/dL 11.9   HEMATOCRIT % 38.2   PLATELETS 10*3/mm3 188       Results from last 7 days  Lab Units 12/17/17  1503   SODIUM mmol/L 133   POTASSIUM mmol/L 4.3   CHLORIDE mmol/L 96*   CO2 mmol/L 26.0   BUN mg/dL 28*   CREATININE mg/dL 1.50*   GLUCOSE mg/dL 149*   CALCIUM mg/dL 9.4   ALT (SGPT) U/L 20   AST (SGOT) U/L 25     Brief Urine Lab Results  (Last result in the past 365 days)      Color   Clarity   Blood   Leuk Est   Nitrite   Protein   CREAT   Urine HCG        12/17/17 1557 Dark Yellow(A) Turbid(A) Small (1+)(A) Large (3+)(A) Positive(A) 30 mg/dL (1+)(A)             BNP   Date Value Ref Range Status   12/17/2017 318.0 (H) 0.0 - 100.0 pg/mL Final     No results found for: PHART  Imaging Results (last 24 hours)     Procedure Component Value Units Date/Time    XR Chest 1 View [[id number]] Collected:  12/17/17 1654     Updated:   12/17/17 1708    Narrative:          EXAMINATION: XR CHEST, SINGLE VIEW - 12/17/2017     INDICATION: Shortness of air.     COMPARISON: 1/06/2016.     FINDINGS: There are postinflammatory changes in both upper lobes.  Cardiac silhouette is normal. There are also bibasilar postinflammatory  changes. There is no acute inflammatory process, mass or effusion.           Impression:       Chronic postinflammatory changes with no acute findings.     DICTATED:     12/17/2017  EDITED:          12/17/2017           This report was finalized on 12/17/2017 5:06 PM by Dr. Nabil Lynn MD.                Assessment/Plan   Assessment / Plan     Hospital Problem List     * (Principal)Sepsis    Vitamin D deficiency    Corticoadrenal insufficiency    Overview Addendum 12/17/2017  8:11 PM by Irene Neal MD     on chronic steroids therapy         COPD (chronic obstructive pulmonary disease)    UTI (urinary tract infection)    Malaise    JANETT (acute kidney injury)    Advanced age    Diabetes mellitus    Overview Signed 12/17/2017  8:10 PM by Irene Neal MD     Type 2         Possible Influenza    Former smoker             Assessment & Plan:  - 3 day course abx for female UTI, received CTX in ED and will continue for 2 more days.  Pt has no abdominal or urinary symptoms, her presentation is more fever, body aches, congestion, SOA, malaise more consistent with URI/Influenza and secondary COPD exac.   - clinically looks like Influenza despite negative rapid swab in ED (poor sensitivity), will empirically start Tamiflu and check PCR.  - IVF's (ordered @ 100ml/hr for 20hrs for total 2L), trend creatinine  - continue Brovana/Pulmicort home nebs, add Atrovent nebs   - continue home basal steroids (for adrenal insufficiency) but will also give IV solumedrol x 3 doses given current respiratory status and can reassess to see if needs continued beyond tomorrow based on improvement  - hold home HTN meds due to sepsis with normotension  - SSI    DVT  prophylaxis: Lovenox  CODE STATUS:  Full Code    Admission Status:  I believe this patient meets INPATIENT status due to the need for care which can only be reasonably provided in an hospital setting such as expedited ancillary services and the necessity for IV medications, close physician monitoring.  In such, I feel patient’s risk for adverse outcomes and need for care warrant INPATIENT evaluation and predict the patient’s care encounter to likely last beyond 2 midnights.    Irene Neal MD   12/17/17   9:36 PM

## 2017-12-19 ENCOUNTER — APPOINTMENT (OUTPATIENT)
Dept: CARDIOLOGY | Facility: HOSPITAL | Age: 82
End: 2017-12-19
Attending: HOSPITALIST

## 2017-12-19 LAB
ANION GAP SERPL CALCULATED.3IONS-SCNC: 10 MMOL/L (ref 3–11)
BACTERIA SPEC AEROBE CULT: ABNORMAL
BACTERIA SPEC AEROBE CULT: ABNORMAL
BH CV ECHO MEAS - AO ROOT AREA (BSA CORRECTED): 1.7
BH CV ECHO MEAS - AO ROOT AREA: 6.1 CM^2
BH CV ECHO MEAS - AO ROOT DIAM: 2.8 CM
BH CV ECHO MEAS - BSA(HAYCOCK): 1.7 M^2
BH CV ECHO MEAS - BSA: 1.7 M^2
BH CV ECHO MEAS - BZI_BMI: 28.3 KILOGRAMS/M^2
BH CV ECHO MEAS - BZI_METRIC_HEIGHT: 154.9 CM
BH CV ECHO MEAS - BZI_METRIC_WEIGHT: 68 KG
BH CV ECHO MEAS - CONTRAST EF (2CH): 63.2 ML/M^2
BH CV ECHO MEAS - CONTRAST EF 4CH: 58 ML/M^2
BH CV ECHO MEAS - EDV(CUBED): 31.5 ML
BH CV ECHO MEAS - EDV(MOD-SP2): 38 ML
BH CV ECHO MEAS - EDV(MOD-SP4): 50 ML
BH CV ECHO MEAS - EDV(TEICH): 39.7 ML
BH CV ECHO MEAS - EF(CUBED): 70.6 %
BH CV ECHO MEAS - EF(MOD-SP2): 63.2 %
BH CV ECHO MEAS - EF(MOD-SP4): 58 %
BH CV ECHO MEAS - EF(TEICH): 63.7 %
BH CV ECHO MEAS - ESV(CUBED): 9.3 ML
BH CV ECHO MEAS - ESV(MOD-SP2): 14 ML
BH CV ECHO MEAS - ESV(MOD-SP4): 21 ML
BH CV ECHO MEAS - ESV(TEICH): 14.4 ML
BH CV ECHO MEAS - FS: 33.5 %
BH CV ECHO MEAS - IVS/LVPW: 1.2
BH CV ECHO MEAS - IVSD: 1.2 CM
BH CV ECHO MEAS - LA DIMENSION: 3 CM
BH CV ECHO MEAS - LA/AO: 1.1
BH CV ECHO MEAS - LAT PEAK E' VEL: 5.5 CM/SEC
BH CV ECHO MEAS - LV DIASTOLIC VOL/BSA (35-75): 29.9 ML/M^2
BH CV ECHO MEAS - LV MASS(C)D: 85.4 GRAMS
BH CV ECHO MEAS - LV MASS(C)DI: 51.1 GRAMS/M^2
BH CV ECHO MEAS - LV MAX PG: 5.5 MMHG
BH CV ECHO MEAS - LV MEAN PG: 2.6 MMHG
BH CV ECHO MEAS - LV SYSTOLIC VOL/BSA (12-30): 12.6 ML/M^2
BH CV ECHO MEAS - LV V1 MAX: 117 CM/SEC
BH CV ECHO MEAS - LV V1 MEAN: 72.9 CM/SEC
BH CV ECHO MEAS - LV V1 VTI: 21.4 CM
BH CV ECHO MEAS - LVIDD: 3.2 CM
BH CV ECHO MEAS - LVIDS: 2.1 CM
BH CV ECHO MEAS - LVLD AP2: 6.3 CM
BH CV ECHO MEAS - LVLD AP4: 6.2 CM
BH CV ECHO MEAS - LVLS AP2: 5.6 CM
BH CV ECHO MEAS - LVLS AP4: 5.6 CM
BH CV ECHO MEAS - LVOT AREA (M): 1.8 CM^2
BH CV ECHO MEAS - LVOT AREA: 1.8 CM^2
BH CV ECHO MEAS - LVOT DIAM: 1.5 CM
BH CV ECHO MEAS - LVPWD: 1.2 CM
BH CV ECHO MEAS - MED PEAK E' VEL: 5.87 CM/SEC
BH CV ECHO MEAS - MV A MAX VEL: 41.5 CM/SEC
BH CV ECHO MEAS - MV E MAX VEL: 114.5 CM/SEC
BH CV ECHO MEAS - MV E/A: 2.8
BH CV ECHO MEAS - RAP SYSTOLE: 8 MMHG
BH CV ECHO MEAS - RVDD: 2.2 CM
BH CV ECHO MEAS - RVSP: 34 MMHG
BH CV ECHO MEAS - SI(CUBED): 13.3 ML/M^2
BH CV ECHO MEAS - SI(LVOT): 23.1 ML/M^2
BH CV ECHO MEAS - SI(MOD-SP2): 14.4 ML/M^2
BH CV ECHO MEAS - SI(MOD-SP4): 17.3 ML/M^2
BH CV ECHO MEAS - SI(TEICH): 15.1 ML/M^2
BH CV ECHO MEAS - SV(CUBED): 22.2 ML
BH CV ECHO MEAS - SV(LVOT): 38.7 ML
BH CV ECHO MEAS - SV(MOD-SP2): 24 ML
BH CV ECHO MEAS - SV(MOD-SP4): 29 ML
BH CV ECHO MEAS - SV(TEICH): 25.3 ML
BH CV ECHO MEAS - TAPSE (>1.6): 1.7 CM2
BH CV ECHO MEAS - TR MAX VEL: 251.5 CM/SEC
BH CV VAS BP RIGHT ARM: NORMAL MMHG
BH CV XLRA - RV BASE: 2.7 CM
BH CV XLRA - RV LENGTH: 6.7 CM
BH CV XLRA - RV MID: 2.6 CM
BH CV XLRA - TDI S': 9.45 CM/SEC
BUN BLD-MCNC: 35 MG/DL (ref 9–23)
BUN/CREAT SERPL: 29.2 (ref 7–25)
CALCIUM SPEC-SCNC: 9.1 MG/DL (ref 8.7–10.4)
CHLORIDE SERPL-SCNC: 101 MMOL/L (ref 99–109)
CO2 SERPL-SCNC: 25 MMOL/L (ref 20–31)
CREAT BLD-MCNC: 1.2 MG/DL (ref 0.6–1.3)
E/E' RATIO: 15
GFR SERPL CREATININE-BSD FRML MDRD: 42 ML/MIN/1.73
GLUCOSE BLD-MCNC: 239 MG/DL (ref 70–100)
GLUCOSE BLDC GLUCOMTR-MCNC: 199 MG/DL (ref 70–130)
GLUCOSE BLDC GLUCOMTR-MCNC: 219 MG/DL (ref 70–130)
GLUCOSE BLDC GLUCOMTR-MCNC: 228 MG/DL (ref 70–130)
GLUCOSE BLDC GLUCOMTR-MCNC: 251 MG/DL (ref 70–130)
LEFT ATRIUM VOLUME INDEX: 25.7 ML/M2
LV EF 2D ECHO EST: 65 %
MAGNESIUM SERPL-MCNC: 1.8 MG/DL (ref 1.3–2.7)
MAXIMAL PREDICTED HEART RATE: 129 BPM
POTASSIUM BLD-SCNC: 3.8 MMOL/L (ref 3.5–5.5)
SODIUM BLD-SCNC: 136 MMOL/L (ref 132–146)
STRESS TARGET HR: 110 BPM

## 2017-12-19 PROCEDURE — 94799 UNLISTED PULMONARY SVC/PX: CPT

## 2017-12-19 PROCEDURE — 97162 PT EVAL MOD COMPLEX 30 MIN: CPT | Performed by: PHYSICAL THERAPIST

## 2017-12-19 PROCEDURE — 80048 BASIC METABOLIC PNL TOTAL CA: CPT | Performed by: HOSPITALIST

## 2017-12-19 PROCEDURE — 25010000002 DIGOXIN PER 500 MCG: Performed by: NURSE PRACTITIONER

## 2017-12-19 PROCEDURE — 25010000002 ENOXAPARIN PER 10 MG

## 2017-12-19 PROCEDURE — 93306 TTE W/DOPPLER COMPLETE: CPT | Performed by: INTERNAL MEDICINE

## 2017-12-19 PROCEDURE — 99233 SBSQ HOSP IP/OBS HIGH 50: CPT | Performed by: HOSPITALIST

## 2017-12-19 PROCEDURE — 99222 1ST HOSP IP/OBS MODERATE 55: CPT | Performed by: INTERNAL MEDICINE

## 2017-12-19 PROCEDURE — 25010000002 FUROSEMIDE PER 20 MG: Performed by: NURSE PRACTITIONER

## 2017-12-19 PROCEDURE — 63710000001 INSULIN DETEMIR PER 5 UNITS: Performed by: HOSPITALIST

## 2017-12-19 PROCEDURE — 25010000002 CEFTRIAXONE PER 250 MG: Performed by: HOSPITALIST

## 2017-12-19 PROCEDURE — 94640 AIRWAY INHALATION TREATMENT: CPT

## 2017-12-19 PROCEDURE — 93306 TTE W/DOPPLER COMPLETE: CPT

## 2017-12-19 PROCEDURE — 94660 CPAP INITIATION&MGMT: CPT

## 2017-12-19 PROCEDURE — 93005 ELECTROCARDIOGRAM TRACING: CPT | Performed by: HOSPITALIST

## 2017-12-19 PROCEDURE — 83735 ASSAY OF MAGNESIUM: CPT | Performed by: HOSPITALIST

## 2017-12-19 PROCEDURE — 93010 ELECTROCARDIOGRAM REPORT: CPT | Performed by: INTERNAL MEDICINE

## 2017-12-19 PROCEDURE — 82962 GLUCOSE BLOOD TEST: CPT

## 2017-12-19 RX ORDER — BISOPROLOL FUMARATE 5 MG/1
2.5 TABLET, FILM COATED ORAL 2 TIMES DAILY
Status: DISCONTINUED | OUTPATIENT
Start: 2017-12-19 | End: 2017-12-22 | Stop reason: HOSPADM

## 2017-12-19 RX ORDER — DILTIAZEM HCL IN NACL,ISO-OSM 125 MG/125
5-15 PLASTIC BAG, INJECTION (ML) INTRAVENOUS
Status: DISCONTINUED | OUTPATIENT
Start: 2017-12-19 | End: 2017-12-22 | Stop reason: HOSPADM

## 2017-12-19 RX ORDER — TORSEMIDE 20 MG/1
10 TABLET ORAL DAILY
Status: DISCONTINUED | OUTPATIENT
Start: 2017-12-19 | End: 2017-12-22 | Stop reason: HOSPADM

## 2017-12-19 RX ORDER — DIGOXIN 0.25 MG/ML
500 INJECTION INTRAMUSCULAR; INTRAVENOUS ONCE
Status: COMPLETED | OUTPATIENT
Start: 2017-12-19 | End: 2017-12-19

## 2017-12-19 RX ORDER — IPRATROPIUM BROMIDE AND ALBUTEROL SULFATE 2.5; .5 MG/3ML; MG/3ML
3 SOLUTION RESPIRATORY (INHALATION)
Status: DISCONTINUED | OUTPATIENT
Start: 2017-12-19 | End: 2017-12-22 | Stop reason: HOSPADM

## 2017-12-19 RX ADMIN — PANTOPRAZOLE SODIUM 40 MG: 40 TABLET, DELAYED RELEASE ORAL at 23:26

## 2017-12-19 RX ADMIN — POLYETHYLENE GLYCOL 3350 17 G: 17 POWDER, FOR SOLUTION ORAL at 08:27

## 2017-12-19 RX ADMIN — ARFORMOTEROL TARTRATE 15 MCG: 15 SOLUTION RESPIRATORY (INHALATION) at 20:56

## 2017-12-19 RX ADMIN — IPRATROPIUM BROMIDE AND ALBUTEROL SULFATE 3 ML: .5; 3 SOLUTION RESPIRATORY (INHALATION) at 20:56

## 2017-12-19 RX ADMIN — CORTISONE ACETATE 25 MG: 25 TABLET ORAL at 18:20

## 2017-12-19 RX ADMIN — DILTIAZEM HCL-SODIUM CHLORIDE IV SOLN 125 MG/125ML-0.9% 5 MG/HR: 125-0.9/125 SOLUTION at 09:32

## 2017-12-19 RX ADMIN — BUDESONIDE 0.5 MG: 0.5 INHALANT RESPIRATORY (INHALATION) at 07:11

## 2017-12-19 RX ADMIN — CORTISONE ACETATE 25 MG: 25 TABLET ORAL at 08:24

## 2017-12-19 RX ADMIN — MONTELUKAST SODIUM 10 MG: 10 TABLET, FILM COATED ORAL at 20:39

## 2017-12-19 RX ADMIN — ASPIRIN 81 MG: 81 TABLET, COATED ORAL at 08:27

## 2017-12-19 RX ADMIN — TORSEMIDE 10 MG: 20 TABLET ORAL at 11:50

## 2017-12-19 RX ADMIN — VITAMIN D, TAB 1000IU (100/BT) 1000 UNITS: 25 TAB at 08:27

## 2017-12-19 RX ADMIN — INSULIN DETEMIR 14 UNITS: 100 INJECTION, SOLUTION SUBCUTANEOUS at 10:05

## 2017-12-19 RX ADMIN — ENOXAPARIN SODIUM 70 MG: 80 INJECTION SUBCUTANEOUS at 20:41

## 2017-12-19 RX ADMIN — IPRATROPIUM BROMIDE AND ALBUTEROL SULFATE 3 ML: .5; 3 SOLUTION RESPIRATORY (INHALATION) at 15:59

## 2017-12-19 RX ADMIN — DIGOXIN 500 MCG: 0.25 INJECTION INTRAMUSCULAR; INTRAVENOUS at 11:50

## 2017-12-19 RX ADMIN — IPRATROPIUM BROMIDE AND ALBUTEROL SULFATE 3 ML: .5; 3 SOLUTION RESPIRATORY (INHALATION) at 12:42

## 2017-12-19 RX ADMIN — IPRATROPIUM BROMIDE 0.5 MG: 0.5 SOLUTION RESPIRATORY (INHALATION) at 07:10

## 2017-12-19 RX ADMIN — ARFORMOTEROL TARTRATE 15 MCG: 15 SOLUTION RESPIRATORY (INHALATION) at 08:06

## 2017-12-19 RX ADMIN — CEFTRIAXONE SODIUM 1 G: 1 INJECTION, SOLUTION INTRAVENOUS at 18:07

## 2017-12-19 RX ADMIN — ROSUVASTATIN CALCIUM 5 MG: 10 TABLET, FILM COATED ORAL at 20:37

## 2017-12-19 RX ADMIN — ENOXAPARIN SODIUM 40 MG: 40 INJECTION SUBCUTANEOUS at 08:36

## 2017-12-19 RX ADMIN — BISOPROLOL FUMARATE 2.5 MG: 5 TABLET ORAL at 20:38

## 2017-12-19 RX ADMIN — BISOPROLOL FUMARATE 2.5 MG: 5 TABLET ORAL at 08:25

## 2017-12-19 RX ADMIN — OSELTAMIVIR PHOSPHATE 30 MG: 30 CAPSULE ORAL at 20:38

## 2017-12-19 RX ADMIN — SOLIFENACIN SUCCINATE 5 MG: 5 TABLET, FILM COATED ORAL at 20:38

## 2017-12-19 NOTE — CONSULTS
Ute Enrique  [id number]  4/7/1926   LOS: 2 days   Patient Care Team:  REFERRING INTERNIST:  Jessica Alex MD  PULMONOLOGIST:  Tito Fontenot MD, [facility]  NEPHROLOGIST:  [facility]     Mrs. Enrique is a 89-year-old , white female, part-time professor of Behavioral Science at , from [place], [place]    Chief Complaint:  Atrial fib with RVR    Problem List:  1. Probable hypertensive cardiovascular disease:  a.  Remote history of dyspnea on exertion with reportedly normal echocardiogram and stress test - data deficit, 1990s.    b. Progressive dyspnea and fatigue with abnormal chest x-ray demonstrating lingular pneumonia with followup CT scan with contrast  revealing evidence of previous diffuse granulomatous infection and lingering pneumonia having resolved, June 2013, with subsequent reported normal PQ scan, June 2013.  c. Recurrent NYHA class III-IV dyspnea on exertion with EKG, July 2013 demonstrating  leftward axis and possible old infarct with normal heart rate, PACs with acceptable echocardiogram echocardiographic GXT with excellent exercise tolerance and capacity (110% predicted) with normal oximetries and overall low probability for significant  focal obstructive coronary disease with residual CCS class I chest pain syndrome NYHA class II exertional dyspnea and fatigue, August 2013.    d. Followup CT scan of the chest without contrast revealing evidence of previous diffuse granulomatous infection  and noted lingular pneumonia, resolved since 06/15/2013 (06/26/2013).  e. Reportedly normal VQ scan ruling out pulmonary embolism - data deficit, 06/15/2013.  f. Remote NYHA class III-IV dyspnea on exertion with EKG demonstrating sinus rhythm  with PACs, left axis deviation, and possible old infarct with ventricular rate of 75 BPM, 07/24/2013.  g. Residual CCS class I angina pectoris/NYHA class II exertional dyspnea and fatigue, March 2016   2. Recent apparent atypical pneumonia, spring  2013:  a.  Diagnosed approximately 6 weeks ago with increasing shortness of breath, pleuritic chest pain, weakness and fatigue.   b. Two rounds of antibiotics with slow improvement, July 2013.  3. Remote tobacco abuse, resolved 1975.  4. Dyslipidemia.   5. ADALGISA with CPAP use.   6. Peripheral vascular disease:  a. Apparent probable arterial duplex demonstrating peripheral vascular disease with initiation of Plavix therapy 3-4 years ago.    b. No claudication symptoms.   No residual TIA or claudication symptoms.   7. Peripheral neuropathy.   8. Paroxysmal atrial fibrillation with RVR 12/17/17 in the setting of sepsis/UTI/influenza A positive, CHADsVasc 6, currently anticoagulated with lovenox  9. Vitamin D deficiency.   10. Stage 3 chronic kidney disease.    11. Osteoarthritis.   12. Osteopenia.   13. Hyperuricemia with intermittent gout.  14. GERD.   15. Corticoadrenal insufficiency, on chronic prednisone therapy.   16. History of seizures.   17. History  of vasovagal syncope with positive tilt table test, data deficit.   18. Type 2 pre-diabetes mellitus.    19. Possible chronic obstructive pulmonary disease.   20. Remote operations:  a. Benign right breast biopsy, 2007.  b. Bilateral cataract removal.     Allergies   Allergen Reactions   • Sulfa Antibiotics Anaphylaxis   • Bactrim [Sulfamethoxazole-Trimethoprim] Hives   • Dilantin [Phenytoin Sodium Extended]    • Keflex [Cephalexin] GI Intolerance   • Fludrocortisone Acetate [Fludrocortisone] Palpitations     Hypertension  Edema     • Phenytoin Rash     Prescriptions Prior to Admission   Medication Sig Dispense Refill Last Dose   • arformoterol (BROVANA) 15 MCG/2ML nebulizer solution Take 15 mcg by nebulization 2 (two) times a day.      • aspirin 81 MG EC tablet Take 81 mg by mouth daily.      • bisoprolol (ZEBeta) 5 MG tablet Take 2.5 mg by mouth Daily.      • budesonide (PULMICORT) 0.5 MG/2ML nebulizer solution Take 0.5 mg by nebulization Daily.      •  cholecalciferol (VITAMIN D3) 1000 units tablet Take 1,000 Units by mouth Daily.      • cortisone (CORTONE) 25 MG tablet Take 25 mg by mouth 2 (Two) Times a Day.      • furosemide (LASIX) 20 MG tablet Take 20 mg by mouth As Needed.      • glimepiride (AMARYL) 1 MG tablet Take 1 mg by mouth Every Morning Before Breakfast.      • montelukast (SINGULAIR) 10 MG tablet Take 10 mg by mouth Every Night.      • pantoprazole (PROTONIX) 20 MG EC tablet Take 40 mg by mouth Every Night.      • rosuvastatin (CRESTOR) 5 MG tablet Take 5 mg by mouth Daily.      • sitaGLIPtin (JANUVIA) 100 MG tablet Take 100 mg by mouth daily.      • Solifenacin Succinate (VESICARE PO) Take 5 mg by mouth Daily.        Scheduled Meds:  arformoterol 15 mcg Nebulization BID - RT   aspirin 81 mg Oral Daily   bisoprolol 2.5 mg Oral Daily   budesonide 0.5 mg Nebulization Daily - RT   ceftriaxone 1 g Intravenous Q24H   cholecalciferol 1,000 Units Oral Daily   cortisone 25 mg Oral BID   enoxaparin 70 mg Subcutaneous Q24H   insulin detemir 14 Units Subcutaneous QAM   insulin lispro 0-9 Units Subcutaneous 4x Daily With Meals & Nightly   insulin lispro 6 Units Subcutaneous TID With Meals   ipratropium 0.5 mg Nebulization 4x Daily - RT   montelukast 10 mg Oral Nightly   oseltamivir 30 mg Oral Q24H   pantoprazole 40 mg Oral Nightly   pharmacy consult - MTM  Does not apply Daily   polyethylene glycol 17 g Oral Daily   rosuvastatin 5 mg Oral Nightly   solifenacin 5 mg Oral Nightly     Continuous Infusions:  diltiaZEM 5-15 mg/hr Last Rate: 5 mg/hr (12/19/17 0932)   Pharmacy to Dose enoxaparin (LOVENOX)          History of Present Illness:   89-year-old white female presents to [facility]17/17 with complaints of malaise, fever, congestion, body aches, wheezing, chills, dyspnea on exertion over the past 24 hours.  She denied any chest pain, abdominal pain, edema, nausea, vomiting.  On ED evaluation, she had fever, hypoxia, tachycardia, leukocytosis, and a urinary tract  "infection.  She has a blood pressure monitor at home but she did not check whether she was tachycardic, and presumes that she was based on her new shortness of breath.  Influenza A positive.  Today she developed new onset atrial fibrillation with RVR.  She denies any current fever, body aches, congestion, hematuria, dysuria, chest pain, edema, nausea, vomiting, diaphoresis, presyncope, syncope.  She continues to have shortness of breath, wheezing, and minimal sputum production.  She was started on a diltiazem drip this morning at 9:30a, currently anticoagulated with lovenox.  She is still a part-time professor at [employer], but states that she feels that maybe after this she should stop teaching.    Cardiac risk factors: advanced age (older than 55 for men, 65 for women), diabetes mellitus, dyslipidemia and hypertension.    Social History     Social History   • Marital status:      Spouse name: N/A   • Number of children: N/A   • Years of education: N/A     Occupational History   • Not on file.     Social History Main Topics   • Smoking status: Never Smoker   • Smokeless tobacco: Not on file   • Alcohol use Yes      Comment: socially    • Drug use: No   • Sexual activity: Defer     Other Topics Concern   • Not on file     Social History Narrative     Family History   Problem Relation Age of Onset   • Heart disease Father    • Hypertension Father        Review of Systems  Pertinent items are noted in HPI and problem list.     Objective:       Physical Exam  /97 (BP Location: Right arm, Patient Position: Lying)  Pulse (!) 136  Temp 97.4 °F (36.3 °C) (Oral)   Resp 16  Ht 154.9 cm (61\")  Wt 68 kg (150 lb)  SpO2 96%  BMI 28.34 kg/m2  Last 2 weights    12/17/17  1428 12/17/17 1955   Weight: 63.5 kg (140 lb) 68 kg (150 lb)     Body mass index is 28.34 kg/(m^2).    Intake/Output Summary (Last 24 hours) at 12/19/17 0948  Last data filed at 12/19/17 0713   Gross per 24 hour   Intake           "    720 ml   Output             2275 ml   Net            -1555 ml       General Appearance:  Alert, cooperative, no distress, appears stated age   Head:  Normocephalic, without obvious abnormality, atraumatic   Eyes:  PERRL, conjunctiva/corneas clear, EOM's intact, fundi benign, both eyes   Throat: Lips, mucosa, and tongue normal; teeth and gums normal   Neck: Supple, symmetrical, trachea midline, no adenopathy, thyroid: not enlarged, symmetric, no tenderness/mass/nodules, no carotid bruit or JVD   Lungs:   Diffuse wheezes/rhonchi to auscultation bilaterally, respirations unlabored, 2 L O2 NC   Heart:  Irregular rhythm and tachy rate, S1, S2 normal, no murmur, rub or gallop   Abdomen:   Soft, non-tender, no masses, no organomegaly, bowel sounds audible x4   Extremities: No edema, normal range of motion   Pulses: 2+ and symmetric   Skin: Skin color, texture, turgor normal, no rashes or lesions   Neurologic: Normal       Cardiographics:    · EK17:Atrial fibrillation with rapid ventricular response  Left axis deviation  Incomplete right bundle branch block  Minimal voltage criteria for LVH, may be normal variant  Nonspecific ST and T wave abnormality , probably digitalis effect  Abnormal ECG; reviewed.    Imaging:    · Chest x-ray:17: Chronic postinflammatory changes with no acute findings; reviewed.    Lab Review     Results from last 7 days  Lab Units 17  0820 17  0527 17  1503   SODIUM mmol/L 136 134 133   POTASSIUM mmol/L 3.8 4.5 4.3   CHLORIDE mmol/L 101 101 96*   CO2 mmol/L 25.0 22.0 26.0   BUN mg/dL 35* 28* 28*   CREATININE mg/dL 1.20 1.20 1.50*   GLUCOSE mg/dL 239* 299* 149*   CALCIUM mg/dL 9.1 7.9* 9.4       Results from last 7 days  Lab Units 17  0527 17  1503   WBC 10*3/mm3 11.16* 15.75*   HEMOGLOBIN g/dL 10.1* 11.9   HEMATOCRIT % 33.5* 38.2   PLATELETS 10*3/mm3 165 188             Assessment:   New onset atrial fibrillation with RVR in the setting of  sepsis/UTI. Leukocytosis/JANETT improving on antibiotics. Will need daily BMP/monitoring due to her corticoadrenal insufficiency and chronic steroid use. Would defer KADEEM/ECV at this time due to her respiratory status. Influenza A positive; on Tamiflu. She has significant bronchospasm at this time.     Plan:   1. Review echocardiogram when available  2. After reviewing echocardiogram, would consider Eliquis 2.5mg bid and       discontinuing lovenox  3. Digoxin 500mcg x once  4. Duonebs q4 PRN  5. Increase bisoprolol to 2.5 mg bid  6. Defer KADEEM/ECV due to respiratory status at this time  7. BMP, ECG in morning  8. Torsemide 10mg daily    Scribed for Erik Walters MD by Analilia Sawyer, APRN. 12/19/2017  10:10 AM    IErik MD, [facility], personally performed the services described in this documentation as scribed by the above named individual in my presence, and it is both accurate and complete.

## 2017-12-19 NOTE — PLAN OF CARE
Problem: Patient Care Overview (Adult)  Goal: Plan of Care Review  Outcome: Ongoing (interventions implemented as appropriate)    12/19/17 0934   Coping/Psychosocial Response Interventions   Plan Of Care Reviewed With patient   Outcome Evaluation   Outcome Summary/Follow up Plan PT evaluation completed on this date. Pt presents with UTI, leukocytosis, and influenza A with hx of PVD, HTN, COPD, and DM. Pt stood from [facility] with CGA, dependent for pericare, MaxA to don shoes, and ambulated 25 feet with CGA using rw. Pt very fatigued and SOA - O2 sat remained stable. Pt will benefit from skilled PT to improve mobility and safety prior to d/c. Pt lives alone and would benefit from inpt rehab prior to d/c.         Problem: Inpatient Physical Therapy  Goal: Transfer Training Goal 1 LTG- PT  Outcome: Ongoing (interventions implemented as appropriate)    12/19/17 0934   Transfer Training PT LTG   Transfer Training PT LTG, Date Established 12/19/17   Transfer Training PT LTG, Time to Achieve 2 wks   Transfer Training PT LTG, Activity Type bed to chair /chair to bed   Transfer Training PT LTG, [place] Level conditional independence   Transfer Training PT LTG, Assist Device (AAD)       Goal: Gait Training Goal LTG- PT  Outcome: Ongoing (interventions implemented as appropriate)    12/19/17 0934   Gait Training PT LTG   Gait Training Goal PT LTG, Date Established 12/19/17   Gait Training Goal PT LTG, Time to Achieve 2 wks   Gait Training Goal PT LTG, [place] Level conditional independence   Gait Training Goal PT LTG, Assist Device (AAD)   Gait Training Goal PT LTG, Distance to Achieve 300 feet       Goal: Stair Training Goal LTG- PT  Outcome: Ongoing (interventions implemented as appropriate)    12/19/17 0934   Stair Training PT LTG   Stair Training Goal PT LTG, Date Established 12/19/17   Stair Training Goal PT LTG, Time to Achieve 2 wks   Stair Training Goal PT LTG, Number of Steps 3 steps   Stair Training Goal PT LTG,  [place] Level supervision required   Stair Training Goal PT LTG, Assist Device 2 handrails

## 2017-12-19 NOTE — PROGRESS NOTES
[facility] Medicine Services  PROGRESS NOTE    Patient Name: Ute Enrique  : 1926  MRN: [id number]    Date of Admission: 2017  Length of Stay: 2  Primary Care Physician: Jessica Alex MD    Subjective   Subjective     CC:  Dyspnea    HPI:  Increased shortness of breath this am. No f/c. Notes palpitations. Weaker. No n/v. No diarrhea. Denies dysuria.    Review of Systems  Gen- No fevers, chills  CV- No chest pain, palpitations  Resp- No cough, dyspnea  GI- No N/V/D, abd pain    Otherwise ROS is negative except as mentioned in the HPI.    Objective   Objective     Vital Signs:   Temp:  [97.4 °F (36.3 °C)-97.7 °F (36.5 °C)] 97.4 °F (36.3 °C)  Heart Rate:  [] 136  Resp:  [16-20] 16  BP: (115-151)/(57-97) 151/97        Physical Exam:      NAD, appears younger than stated age  OP clear, MMM  Neck supple  Tachy  Rales at bases, slightly labored  +BS, ND, NT  STYLES  No c/c/e  No rashes  Normal affect    Results Reviewed:  I have personally reviewed current lab, radiology, and data and agree.      Results from last 7 days  Lab Units 17  0527 17  1503   WBC 10*3/mm3 11.16* 15.75*   HEMOGLOBIN g/dL 10.1* 11.9   HEMATOCRIT % 33.5* 38.2   PLATELETS 10*3/mm3 165 188       Results from last 7 days  Lab Units 17  0527 17  1503   SODIUM mmol/L 134 133   POTASSIUM mmol/L 4.5 4.3   CHLORIDE mmol/L 101 96*   CO2 mmol/L 22.0 26.0   BUN mg/dL 28* 28*   CREATININE mg/dL 1.20 1.50*   GLUCOSE mg/dL 299* 149*   CALCIUM mg/dL 7.9* 9.4   ALT (SGPT) U/L  --  20   AST (SGOT) U/L  --  25     BNP   Date Value Ref Range Status   2017 318.0 (H) 0.0 - 100.0 pg/mL Final     No results found for: PHART    Microbiology Results Abnormal     Procedure Component Value - Date/Time    Urine Culture - Urine, Urine, Clean Catch [[id number]]  (Abnormal) Collected:  17 6033    Lab Status:  Preliminary result Specimen:  Urine from Urine, Clean Catch Updated:  17 0843      Urine Culture --      >100,000 CFU/mL Gram Negative Bacilli (A)    Influenza A & B, RT PCR - Swab, Nasopharynx [[id number]]  (Abnormal) Collected:  12/17/17 2221    Lab Status:  Final result Specimen:  Swab from Nasopharynx Updated:  12/18/17 0800     Influenza A PCR Detected (C)     Influenza B PCR Not Detected    Influenza Antigen, Rapid - Swab, Nasopharynx [[id number]]  (Normal) Collected:  12/17/17 1558    Lab Status:  Final result Specimen:  Swab from Nasopharynx Updated:  12/17/17 1627     Influenza A Ag, EIA Negative     Influenza B Ag, EIA Negative          Imaging Results (last 24 hours)     ** No results found for the last 24 hours. **             I have reviewed the medications.    Assessment/Plan   Assessment / Plan     Hospital Problem List     * (Principal)Sepsis    Vitamin D deficiency    Corticoadrenal insufficiency    Overview Addendum 12/17/2017  8:11 PM by Irene Neal MD     on chronic steroids therapy         COPD (chronic obstructive pulmonary disease)    UTI (urinary tract infection)    Malaise    JANETT (acute kidney injury)    Advanced age    Diabetes mellitus    Overview Signed 12/17/2017  8:10 PM by Irene Neal MD     Type 2         Possible Influenza    Former smoker           Brief Hospital Course to date:  Ute Enrique is a 89 y.o. female with PMHX significant for adrenal insufficiency on chronic steroids, remote smoker, COPD, ADALGISA with CPAP use, hypertension, type 2 diabetes, CKD    Assessment & Plan:  - Acute hypoxemic respiratory failure: Resolved. Currently back on RA  - Influenza A: Continue Tamiflu  - GNR UTI/POA: Ceftriaxone  - JANETT on CKD: Baseline Cr 1.1-1.2.  Improved  - New onset Afib/RVR, ECHO ordered, on Lovenox, consult cardiology for possible cardioversion, cardizem for rate control as symptomatic  - Adrenal insufficiency on chronic steroids: Home cortisone 25mg  - COPD: Pulmicort, ipratropium nebs, methylpred  - ADALGISA: Home CPAP   - Hypertension: Home Bisoprolol  - T2DM: A1C  pending, titrating insulin    DVT Prophylaxis:  Lovenox therapeutic for afib for now    CODE STATUS: Full Code    Disposition: I expect the patient to be discharged home in 1-2 days    Titi Sigala MD  12/19/17  8:18 AM

## 2017-12-19 NOTE — THERAPY EVALUATION
Acute Care - Physical Therapy Initial Evaluation  [facility]     Patient Name: Ute Enrique  : 1926  MRN: [id number]  Today's Date: 2017   Onset of Illness/Injury or Date of Surgery Date: 17  Date of Referral to PT: 17  Referring Physician: Samira Anna MD      Admit Date: 2017     Visit Dx:    ICD-10-CM ICD-9-CM   1. Urinary tract infection without hematuria, site unspecified N39.0 599.0   2. COPD exacerbation J44.1 491.21   3. Shortness of breath R06.02 786.05   4. Generalized weakness R53.1 780.79   5. Leukocytosis, unspecified type D72.829 288.60   6. Acute renal failure, unspecified acute renal failure type N17.9 584.9   7. Impaired functional mobility, balance, gait, and endurance Z74.09 V49.89     Patient Active Problem List   Diagnosis   • Hypertensive cardiovascular disease   • Dyslipidemia   • ADALGISA (obstructive sleep apnea)   • Peripheral neuropathy   • Vitamin D deficiency   • Kidney disease   • Peripheral vascular disease   • Osteoarthritis   • Osteopenia   • Hyperuricemia   • Corticoadrenal insufficiency   • COPD (chronic obstructive pulmonary disease)   • Sepsis   • UTI (urinary tract infection)   • Malaise   • JANETT (acute kidney injury)   • Advanced age   • Diabetes mellitus   • Possible Influenza   • Former smoker     Past Medical History:   Diagnosis Date   • Adrenal insufficiency    • Atypical pneumonia     Spring 2013-Diagnosed approximately 6 weeks ago with increasing shortness of breath, pleuritic chest pain, weakness and fatigue b.Two rounds of antibiotics with slow improvement, 2013.    • COPD (chronic obstructive pulmonary disease)    • Corticoadrenal insufficiency     on chronic prednisone therapy   • Diabetes mellitus     Type 2   • Dyslipidemia    • GERD (gastroesophageal reflux disease)    • Hypertensive cardiovascular disease    • Hyperuricemia      with intermittent gout   • Kidney disease     chronic- stage 3   • ADALGISA (obstructive sleep apnea)      with CPAP use   • Osteoarthritis    • Osteopenia    • Peripheral neuropathy    • Peripheral vascular disease    • Seizures    • Vasovagal syncope     with positive tilt table test, data deficit   • Vitamin D deficiency      Past Surgical History:   Procedure Laterality Date   • BREAST SURGERY Right 2007    Breast biopsy   • CATARACT EXTRACTION Bilateral           PT ASSESSMENT (last 72 hours)      PT Evaluation       12/19/17 0934 12/18/17 1408    Rehab Evaluation    Document Type evaluation  -LM     Subjective Information agree to therapy;complains of;weakness  -LM     Patient Effort, Rehab Treatment good  -LM     Symptoms Noted During/After Treatment fatigue  -LM     General Information    Patient Profile Review yes  -LM     Onset of Illness/Injury or Date of Surgery Date 12/17/17  -LM     Referring Physician Samira Anna MD  -     General Observations Pt sitting on BSC upon entering room.  Pt pleasant and agreeable to PT eval.  -LM     Pertinent History Of Current Problem Presents with congestion, fever, malaise, and body aches.  Dx: Leukocytosis/UTI/Sepsis/Influenza A.  Hx: PVD, HTN, DM, CKD, A-fib  -LM     Precautions/Limitations fall precautions;other (see comments)   Droplet Precautions  -LM     Prior Level of Function independent:;all household mobility;gait;ADL's  -LM     Equipment Currently Used at Home cane, straight;bath bench   Uses SC in public and to go to the bathroom at night  -LM bath bench;bipap/ cpap  -TC    Plans/Goals Discussed With patient;agreed upon  -LM     Risks Reviewed patient:;LOB;increased discomfort  -LM     Benefits Reviewed patient:;improve function;increase independence;increase strength;increase balance  -LM     Barriers to Rehab none identified  -LM     Living Environment    Lives With alone  -LM alone  -TC    Living Arrangements house  -LM house  -TC    Home Accessibility stairs to enter home;bed and bath on same level  -LM no concerns  -TC    Number of Stairs to Enter  "Home 3  -LM     Stair Railings at Home outside, present at both sides  -LM none  -TC    Type of Financial/Environmental Concern  none  -TC    Transportation Available  other (see comments)   pt will need transportation to home with DC'd, family lives out of town  -TC    Clinical Impression    Date of Referral to PT 12/17/17  -LM     PT Diagnosis Impaired functional mobility, balance, gait, and endurance  -LM     Patient/Family Goals Statement \"I want to get stronger\"  -LM     Criteria for Skilled Therapeutic Interventions Met yes;treatment indicated  -LM     Rehab Potential good, to achieve stated therapy goals  -LM     Vital Signs    Pre Systolic BP Rehab 151  -LM     Pre Treatment Diastolic BP 97  -LM     Pretreatment Heart Rate (beats/min) 124  -LM     Posttreatment Heart Rate (beats/min) 132  -LM     Pre SpO2 (%) 97  -LM     O2 Delivery Pre Treatment supplemental O2  -LM     Post SpO2 (%) 96  -LM     O2 Delivery Post Treatment supplemental O2  -LM     Pre Patient Position Sitting  -LM     Intra Patient Position Standing  -LM     Post Patient Position Supine  -LM     Pain Assessment    Pain Assessment 0-10  -LM     Pain Score 0  -LM     Post Pain Score 0  -LM     Cognitive Assessment/Intervention    Current Cognitive/Communication Assessment functional  -LM     Orientation Status oriented x 4  -LM     Follows Commands/Answers Questions 100% of the time  -LM     Personal Safety decreased awareness, need for assist  -LM     Personal Safety Interventions fall prevention program maintained;gait belt;nonskid shoes/slippers when out of bed  -LM     ROM (Range of Motion)    General ROM Detail BLE AROM WFL with exception of decreased active L hip flex - AAROM WFL  -LM     MMT (Manual Muscle Testing)    General MMT Assessment Detail LLE - Hip flex - 2+/5; Knee flex/ext - 4/5; Ankle DF- 4-/5; RLE - Hip flex - 4-/5; Knee flex/ext - 4/5; Ankle DF - 4-/5  -LM     Bed Mobility, Assessment/Treatment    Bed Mobility, Assistive " Device bed rails;head of bed elevated  -LM     Bed Mob, Supine to Sit, Anderson not tested  -LM     Bed Mob, Sit to Supine, Anderson supervision required;verbal cues required  -LM     Bed Mobility, Comment Pt required MaxA to don socks/shoes.  -LM     Transfer Assessment/Treatment    Transfers, Sit-Stand [place] contact guard assist;verbal cues required  -LM     Transfers, Stand-Sit [place] contact guard assist;verbal cues required  -LM     Transfers, Sit-Stand-Sit, Assist Device rolling walker  -LM     Toilet Transfer, [place] contact guard assist  -LM     Toilet Transfer, Assistive Device bedside commode without drop arms  -LM     Transfer, Comment Pt sitting on BSC upon entering room.  Pt dependent for pericare.  Required assist to pull panties back up.  -LM     Gait Assessment/Treatment    Gait, [place] Level contact guard assist  -LM     Gait, Assistive Device rolling walker  -LM     Gait, Distance (Feet) 25  -LM     Gait, Gait Deviations valentine decreased;forward flexed posture;step length decreased  -LM     Gait, Safety Issues supplemental O2  -LM     Gait, Impairments strength decreased;impaired balance  -LM     Gait, Comment Pt fatigued post ambulation and SOA - however O2 sats remained stable.  Educated on PLB with activity.  -LM     Motor Skills/Interventions    Additional Documentation Balance Skills Training (Group)  -LM     Balance Skills Training    Sitting-Level of Assistance Close supervision  -LM     Sitting-Balance Support Feet supported  -LM     Sitting-Balance Activities Reaching across midline  -LM     Standing-Level of Assistance Contact guard  -LM     Static Standing Balance Support assistive device  -LM     Standing-Balance Activities Weight Shift A-P;Weight Shift R-L;Forward lean  -LM     Gait Balance-Level of Assistance Contact guard  -LM     Gait Balance Support assistive device  -LM     Sensory Assessment/Intervention    Sensory Impairment numbness  -LM      Light Touch LLE;RLE  -LM     LLE Light Touch moderate impairment   Unable to feel light touch on B feet  -LM     RLE Light Touch moderate impairment   Unable to feel light touch on B feet  -LM     Positioning and Restraints    Pre-Treatment Position bedside commode  -LM     Post Treatment Position bed  -LM     In Bed supine;call light within reach;encouraged to call for assist;exit alarm on;notified nsg  -LM       12/17/17 2000       General Information    Equipment Currently Used at Home bath bench;bipap/ cpap  -SS     Living Environment    Lives With alone  -     Living Arrangements house  -       User Key  (r) = Recorded By, (t) = Taken By, (c) = Cosigned By    Initials Name Provider Type    LM Orly Lehman PT Physical Therapist    TC Beverly Abraham, RN Case Manager    SS Danitza Winslow, JOSEY Registered Nurse          Physical Therapy Education     Title: PT OT SLP Therapies (Active)     Topic: Physical Therapy (Active)     Point: Mobility training (Active)    Learning Progress Summary    Learner Readiness Method Response Comment Documented by Status   Patient Acceptance E NR Reviewed safety with transfers and ambulation.  Educated pt on PLB with activity.  12/19/17 1016 Active               Point: Precautions (Active)    Learning Progress Summary    Learner Readiness Method Response Comment Documented by Status   Patient Acceptance E NR Reviewed safety with transfers and ambulation.  Educated pt on PLB with activity.  12/19/17 1016 Active                      User Key     Initials Effective Dates Name Provider Type Discipline     06/15/16 -  Orly Lehman PT Physical Therapist PT                PT Recommendation and Plan  Anticipated Discharge Disposition: inpatient rehabilitation facility  PT Frequency: daily  Plan of Care Review  Plan Of Care Reviewed With: patient  Outcome Summary/Follow up Plan: PT evaluation completed on this date.  Pt presents with UTI, leukocytosis, and influenza A with hx of  PVD, HTN, COPD, and DM.  Pt stood from [facility] with CGA, dependent for pericare, MaxA to don shoes, and ambulated 25 feet with CGA using rw.  Pt very fatigued and SOA - O2 sat remained stable.  Pt  will benefit from skilled PT to improve mobility and safety prior to d/c.  Pt lives alone and would benefit from inpt rehab prior to d/c.          IP PT Goals       12/19/17 0934          Transfer Training PT LTG    Transfer Training PT LTG, Date Established 12/19/17  -LM      Transfer Training PT LTG, Time to Achieve 2 wks  -LM      Transfer Training PT LTG, Activity Type bed to chair /chair to bed  -LM      Transfer Training PT LTG, [place] Level conditional independence  -LM      Transfer Training PT LTG, Assist Device --   AAD  -LM      Gait Training PT LTG    Gait Training Goal PT LTG, Date Established 12/19/17  -LM      Gait Training Goal PT LTG, Time to Achieve 2 wks  -LM      Gait Training Goal PT LTG, [place] Level conditional independence  -LM      Gait Training Goal PT LTG, Assist Device --   AAD  -LM      Gait Training Goal PT LTG, Distance to Achieve 300 feet  -LM      Stair Training PT LTG    Stair Training Goal PT LTG, Date Established 12/19/17  -LM      Stair Training Goal PT LTG, Time to Achieve 2 wks  -LM      Stair Training Goal PT LTG, Number of Steps 3 steps  -LM      Stair Training Goal PT LTG, [place] Level supervision required  -LM      Stair Training Goal PT LTG, Assist Device 2 handrails  -LM        User Key  (r) = Recorded By, (t) = Taken By, (c) = Cosigned By    Initials Name Provider Type    LM Orly Lehman PT Physical Therapist                Outcome Measures       12/19/17 0934          How much help from another person do you currently need...    Turning from your back to your side while in flat bed without using bedrails? 3  -LM      Moving from lying on back to sitting on the side of a flat bed without bedrails? 3  -LM      Moving to and from a bed to a chair (including a  wheelchair)? 3  -LM      Standing up from a chair using your arms (e.g., wheelchair, bedside chair)? 3  -LM      Climbing 3-5 steps with a railing? 2  -LM      To walk in hospital room? 3  -LM      AM-PAC 6 Clicks Score 17  -LM      Functional Assessment    Outcome Measure Options AM-PAC 6 Clicks Basic Mobility (PT)  -LM        User Key  (r) = Recorded By, (t) = Taken By, (c) = Cosigned By    Initials Name Provider Type    JESSICA Lehman PT Physical Therapist           Time Calculation:         PT Charges       12/19/17 0934          Time Calculation    Start Time 0934  -LM      PT Received On 12/19/17  -LM      PT Goal Re-Cert Due Date 12/29/17  -LM        User Key  (r) = Recorded By, (t) = Taken By, (c) = Cosigned By    Initials Name Provider Type    JESSICA Lehman PT Physical Therapist          Therapy Charges for Today     Code Description Service Date Service Provider Modifiers Qty    [id number] HC PT EVAL MOD COMPLEXITY 4 12/19/2017 Orly Lehman PT GP 1          PT G-Codes  Outcome Measure Options: AM-PAC 6 Clicks Basic Mobility (PT)      Orly Lehman PT  12/19/2017

## 2017-12-19 NOTE — PROGRESS NOTES
"Pharmacy Consult--Enoxaparin Dosing  Ute Enrique is a  89 y.o. female receiving enoxaparin therapy.     Indication:  Atrial Fibrillation  Consulting Provider:  Dr. Sigala    Allergies  Allergies as of 12/17/2017 - Jairo as Reviewed 12/17/2017   Allergen Reaction Noted   • Sulfa antibiotics Anaphylaxis 08/03/2016   • Bactrim [sulfamethoxazole-trimethoprim] Hives 09/01/2016   • Dilantin [phenytoin sodium extended]  09/01/2016   • Keflex [cephalexin] GI Intolerance 08/03/2016   • Fludrocortisone acetate [fludrocortisone] Palpitations 09/01/2016   • Phenytoin Rash 09/01/2016     Labs    Results from last 7 days     Lab Units 12/18/17  0527 12/17/17  1503   SODIUM mmol/L 134 133   POTASSIUM mmol/L 4.5 4.3   CHLORIDE mmol/L 101 96*   CO2 mmol/L 22.0 26.0   BUN mg/dL 28* 28*   CREATININE mg/dL 1.20 1.50*   CALCIUM mg/dL 7.9* 9.4   BILIRUBIN mg/dL --  1.1   ALK PHOS U/L --  56   ALT (SGPT) U/L --  20   AST (SGOT) U/L --  25   GLUCOSE mg/dL 299* 149*       Results from last 7 days     Lab Units 12/18/17  0527 12/17/17  1503   WBC 10*3/mm3 11.16* 15.75*   HEMOGLOBIN g/dL 10.1* 11.9   HEMATOCRIT % 33.5* 38.2   PLATELETS 10*3/mm3 165 188     Evaluation of Dosing     Last Dose Received:  30mg of enoxaparin given 12/18 at ~2100    Ht - 154.9 cm (61\")  Wt - 68 kg (150 lb)    Estimated Creatinine Clearance: 26.9 mL/min (by C-G formula based on Cr of 1.2).    Intake & Output (last 3 days)         12/16 0701 - 12/17 0700 12/17 0701 - 12/18 0700 12/18 0701 - 12/19 0700 12/19 0701 - 12/20 0700      P.O.   1080     IV Piggyback  1050      Total Intake(mL/kg)  1050 (15.4) 1080 (15.9)     Urine (mL/kg/hr)  1150 2175 (1.3) 600 (6.7)    Stool   0 (0)     Total Output   1150 2175 600    Net   100 -1095 -600            Unmeasured Urine Occurrence   1 x     Unmeasured Stool Occurrence   2 x           Assessment/Plan:  1.  40mg enoxparin SC x 1 now.  2.  Start enoxaparin 70mg SC q24h starting tonight at 2100.  3.  Will monitor renal " function for improvement; recent SCr improvement and could expect change to q12h dosing soon based on improvement.  However, with age, I would be conservative for the near future.  4.  Pharmacy will continue to follow and adjust dose based on renal function and clinical status.    Thanks,  Jose Roman, PharmD, BCPS  #[id number]  12/19/17  8:22 AM

## 2017-12-19 NOTE — PLAN OF CARE
Problem: Patient Care Overview (Adult)  Goal: Plan of Care Review  Outcome: Ongoing (interventions implemented as appropriate)    12/19/17 0418   Coping/Psychosocial Response Interventions   Plan Of Care Reviewed With patient   Patient Care Overview   Progress no change   Outcome Evaluation   Outcome Summary/Follow up Plan Pt awake most of HS. On CPAP for 4 hours. O2 at 2L for sats in 90's. Dyspnea with exertion. Unsteady. VSS.         Problem: Sepsis (Adult)  Goal: Signs and Symptoms of Listed Potential Problems Will be Absent or Manageable (Sepsis)  Outcome: Ongoing (interventions implemented as appropriate)    Problem: Fall Risk (Adult)  Goal: Identify Related Risk Factors and Signs and Symptoms  Outcome: Ongoing (interventions implemented as appropriate)  Goal: Absence of Falls  Outcome: Ongoing (interventions implemented as appropriate)

## 2017-12-19 NOTE — PLAN OF CARE
Problem: Patient Care Overview (Adult)  Goal: Plan of Care Review  Outcome: Ongoing (interventions implemented as appropriate)    12/19/17 5594   Coping/Psychosocial Response Interventions   Plan Of Care Reviewed With patient   Patient Care Overview   Progress no change   Outcome Evaluation   Outcome Summary/Follow up Plan pt noted to be in new onset afib with rvr at the start of shift. MD notified and cardizem gtt initiated and rate control established. Remains afib on the monitor with cardizem going at 5mg/hr. All VSS will continue to monitor.         Problem: Sepsis (Adult)  Goal: Signs and Symptoms of Listed Potential Problems Will be Absent or Manageable (Sepsis)  Outcome: Ongoing (interventions implemented as appropriate)    Problem: Fall Risk (Adult)  Goal: Identify Related Risk Factors and Signs and Symptoms  Outcome: Ongoing (interventions implemented as appropriate)  Goal: Absence of Falls  Outcome: Ongoing (interventions implemented as appropriate)    Problem: Arrhythmia/Dysrhythmia (Symptomatic) (Adult)  Goal: Signs and Symptoms of Listed Potential Problems Will be Absent or Manageable (Arrhythmia/Dysrhythmia)  Outcome: Ongoing (interventions implemented as appropriate)

## 2017-12-20 LAB
ANION GAP SERPL CALCULATED.3IONS-SCNC: 11 MMOL/L (ref 3–11)
BUN BLD-MCNC: 36 MG/DL (ref 9–23)
BUN/CREAT SERPL: 36 (ref 7–25)
CALCIUM SPEC-SCNC: 8.5 MG/DL (ref 8.7–10.4)
CHLORIDE SERPL-SCNC: 103 MMOL/L (ref 99–109)
CO2 SERPL-SCNC: 24 MMOL/L (ref 20–31)
CREAT BLD-MCNC: 1 MG/DL (ref 0.6–1.3)
GFR SERPL CREATININE-BSD FRML MDRD: 52 ML/MIN/1.73
GLUCOSE BLD-MCNC: 180 MG/DL (ref 70–100)
GLUCOSE BLDC GLUCOMTR-MCNC: 164 MG/DL (ref 70–130)
GLUCOSE BLDC GLUCOMTR-MCNC: 176 MG/DL (ref 70–130)
GLUCOSE BLDC GLUCOMTR-MCNC: 200 MG/DL (ref 70–130)
POTASSIUM BLD-SCNC: 4.1 MMOL/L (ref 3.5–5.5)
SODIUM BLD-SCNC: 138 MMOL/L (ref 132–146)

## 2017-12-20 PROCEDURE — 82962 GLUCOSE BLOOD TEST: CPT

## 2017-12-20 PROCEDURE — 94760 N-INVAS EAR/PLS OXIMETRY 1: CPT

## 2017-12-20 PROCEDURE — 94799 UNLISTED PULMONARY SVC/PX: CPT

## 2017-12-20 PROCEDURE — 25010000002 CEFTRIAXONE PER 250 MG: Performed by: HOSPITALIST

## 2017-12-20 PROCEDURE — 80048 BASIC METABOLIC PNL TOTAL CA: CPT | Performed by: NURSE PRACTITIONER

## 2017-12-20 PROCEDURE — 97116 GAIT TRAINING THERAPY: CPT | Performed by: PHYSICAL THERAPIST

## 2017-12-20 PROCEDURE — 97110 THERAPEUTIC EXERCISES: CPT | Performed by: PHYSICAL THERAPIST

## 2017-12-20 PROCEDURE — 63710000001 INSULIN DETEMIR PER 5 UNITS: Performed by: HOSPITALIST

## 2017-12-20 PROCEDURE — 99232 SBSQ HOSP IP/OBS MODERATE 35: CPT | Performed by: INTERNAL MEDICINE

## 2017-12-20 PROCEDURE — 94640 AIRWAY INHALATION TREATMENT: CPT

## 2017-12-20 PROCEDURE — 99233 SBSQ HOSP IP/OBS HIGH 50: CPT | Performed by: HOSPITALIST

## 2017-12-20 RX ADMIN — IPRATROPIUM BROMIDE AND ALBUTEROL SULFATE 3 ML: .5; 3 SOLUTION RESPIRATORY (INHALATION) at 20:05

## 2017-12-20 RX ADMIN — IPRATROPIUM BROMIDE AND ALBUTEROL SULFATE 3 ML: .5; 3 SOLUTION RESPIRATORY (INHALATION) at 17:03

## 2017-12-20 RX ADMIN — CORTISONE ACETATE 25 MG: 25 TABLET ORAL at 09:28

## 2017-12-20 RX ADMIN — ASPIRIN 81 MG: 81 TABLET, COATED ORAL at 09:27

## 2017-12-20 RX ADMIN — INSULIN DETEMIR 16 UNITS: 100 INJECTION, SOLUTION SUBCUTANEOUS at 09:48

## 2017-12-20 RX ADMIN — IPRATROPIUM BROMIDE AND ALBUTEROL SULFATE 3 ML: .5; 3 SOLUTION RESPIRATORY (INHALATION) at 09:35

## 2017-12-20 RX ADMIN — TORSEMIDE 10 MG: 20 TABLET ORAL at 09:26

## 2017-12-20 RX ADMIN — MONTELUKAST SODIUM 10 MG: 10 TABLET, FILM COATED ORAL at 22:01

## 2017-12-20 RX ADMIN — BUDESONIDE 0.5 MG: 0.5 INHALANT RESPIRATORY (INHALATION) at 09:35

## 2017-12-20 RX ADMIN — SOLIFENACIN SUCCINATE 5 MG: 5 TABLET, FILM COATED ORAL at 22:01

## 2017-12-20 RX ADMIN — PANTOPRAZOLE SODIUM 40 MG: 40 TABLET, DELAYED RELEASE ORAL at 22:02

## 2017-12-20 RX ADMIN — OSELTAMIVIR PHOSPHATE 30 MG: 30 CAPSULE ORAL at 22:02

## 2017-12-20 RX ADMIN — BISOPROLOL FUMARATE 2.5 MG: 5 TABLET ORAL at 09:25

## 2017-12-20 RX ADMIN — ARFORMOTEROL TARTRATE 15 MCG: 15 SOLUTION RESPIRATORY (INHALATION) at 09:35

## 2017-12-20 RX ADMIN — BISOPROLOL FUMARATE 2.5 MG: 5 TABLET ORAL at 22:01

## 2017-12-20 RX ADMIN — APIXABAN 2.5 MG: 2.5 TABLET, FILM COATED ORAL at 22:01

## 2017-12-20 RX ADMIN — VITAMIN D, TAB 1000IU (100/BT) 1000 UNITS: 25 TAB at 09:25

## 2017-12-20 RX ADMIN — CEFTRIAXONE SODIUM 1 G: 1 INJECTION, SOLUTION INTRAVENOUS at 17:29

## 2017-12-20 RX ADMIN — APIXABAN 2.5 MG: 2.5 TABLET, FILM COATED ORAL at 09:38

## 2017-12-20 RX ADMIN — ROSUVASTATIN CALCIUM 5 MG: 10 TABLET, FILM COATED ORAL at 22:01

## 2017-12-20 RX ADMIN — CORTISONE ACETATE 25 MG: 25 TABLET ORAL at 19:33

## 2017-12-20 RX ADMIN — ARFORMOTEROL TARTRATE 15 MCG: 15 SOLUTION RESPIRATORY (INHALATION) at 20:09

## 2017-12-20 NOTE — PLAN OF CARE
Problem: Patient Care Overview (Adult)  Goal: Plan of Care Review  Outcome: Ongoing (interventions implemented as appropriate)   12/20/17 1500   Coping/Psychosocial Response Interventions   Plan Of Care Reviewed With patient   Patient Care Overview   Progress progress toward functional goals as expected   Outcome Evaluation   Outcome Summary/Follow up Plan Pt feeling better on this date. Increased gait distance to 150 feet with CGA using rw. O2 dropped to 85% with ambulation. Pt progressing and will continue to benefit from skilled PT to improve mobility and safety.       Problem: Inpatient Physical Therapy  Goal: Transfer Training Goal 1 LTG- PT  Outcome: Ongoing (interventions implemented as appropriate)   12/19/17 0934 12/20/17 1500   Transfer Training PT LTG   Transfer Training PT LTG, Date Established 12/19/17 --    Transfer Training PT LTG, Time to Achieve 2 wks --    Transfer Training PT LTG, Activity Type bed to chair /chair to bed --    Transfer Training PT LTG, [place] Level conditional independence --    Transfer Training PT LTG, Assist Device (AAD) --    Transfer Training PT LTG, Outcome --  goal ongoing     Goal: Gait Training Goal LTG- PT  Outcome: Ongoing (interventions implemented as appropriate)   12/19/17 0934 12/20/17 1500   Gait Training PT LTG   Gait Training Goal PT LTG, Date Established 12/19/17 --    Gait Training Goal PT LTG, Time to Achieve 2 wks --    Gait Training Goal PT LTG, [place] Level conditional independence --    Gait Training Goal PT LTG, Assist Device (AAD) --    Gait Training Goal PT LTG, Distance to Achieve 300 feet --    Gait Training Goal PT LTG, Outcome --  goal ongoing     Goal: Stair Training Goal LTG- PT  Outcome: Ongoing (interventions implemented as appropriate)   12/19/17 0934 12/20/17 1500   Stair Training PT LTG   Stair Training Goal PT LTG, Date Established 12/19/17 --    Stair Training Goal PT LTG, Time to Achieve 2 wks --    Stair Training Goal PT LTG,  Number of Steps 3 steps --    Stair Training Goal PT LTG, [place] Level supervision required --    Stair Training Goal PT LTG, Assist Device 2 handrails --    Stair Training Goal PT LTG, Outcome --  goal ongoing

## 2017-12-20 NOTE — PLAN OF CARE
Problem: Patient Care Overview (Adult)  Goal: Plan of Care Review  Outcome: Ongoing (interventions implemented as appropriate)   12/20/17 1557   Coping/Psychosocial Response Interventions   Plan Of Care Reviewed With patient   Patient Care Overview   Progress no change     Goal: Adult Individualization and Mutuality  Outcome: Ongoing (interventions implemented as appropriate)   12/20/17 8267   Individualization   Patient Specific Preferences none   Patient Specific Goals VS WNL for patient, independent with ADL's   Patient Specific Interventions assist with ADL's while encouraging independence, monitor VS     Goal: Discharge Needs Assessment  Outcome: Ongoing (interventions implemented as appropriate)   12/20/17 3967   Discharge Needs Assessment   Concerns To Be Addressed denies needs/concerns at this time

## 2017-12-20 NOTE — PLAN OF CARE
Problem: Fall Risk (Adult)  Goal: Identify Related Risk Factors and Signs and Symptoms  Outcome: Ongoing (interventions implemented as appropriate)   12/20/17 8091   Fall Risk   Fall Risk: Related Risk Factors gait/mobility problems;sleep pattern alteration   Fall Risk: Signs and Symptoms presence of risk factors     Goal: Absence of Falls  Outcome: Ongoing (interventions implemented as appropriate)   12/20/17 2171   Fall Risk (Adult)   Absence of Falls making progress toward outcome

## 2017-12-20 NOTE — PLAN OF CARE
Problem: Patient Care Overview (Adult)  Goal: Plan of Care Review  Outcome: Ongoing (interventions implemented as appropriate)   12/20/17 0441   Coping/Psychosocial Response Interventions   Plan Of Care Reviewed With patient   Patient Care Overview   Progress progress toward functional goals as expected   Outcome Evaluation   Outcome Summary/Follow up Plan 2L during the day CPAP at night as at home reains in afib with good rate control 70-80's cardizem gtt at 5 mg per hour very dyspenic with activity

## 2017-12-20 NOTE — PROGRESS NOTES
[facility] Medicine Services  PROGRESS NOTE    Patient Name: Ute Enrique  : 1926  MRN: [id number]    Date of Admission: 2017  Length of Stay: 3  Primary Care Physician: Jessica Alex MD    Subjective   Subjective     CC:  Dyspnea    HPI:  Less short of breath. Less weak, feeling better. No f/c. No n/v. Dry cough only.    Review of Systems  Gen- No fevers, chills  CV- No chest pain, palpitations  Resp- No cough, dyspnea  GI- No N/V/D, abd pain    Otherwise ROS is negative except as mentioned in the HPI.    Objective   Objective     Vital Signs:   Temp:  [97.3 °F (36.3 °C)-97.9 °F (36.6 °C)] 97.9 °F (36.6 °C)  Heart Rate:  [] 81  Resp:  [17-22] 18  BP: (107-134)/(58-90) 120/80        Physical Exam:      NAD, appears younger than stated age  OP clear, MMM  Neck supple  RRR this am  Rales at bases, non-labored  +BS, ND, NT  STYLES  No c/c/e  No rashes  Normal affect    Results Reviewed:  I have personally reviewed current lab, radiology, and data and agree.      Results from last 7 days  Lab Units 17  0527 17  1503   WBC 10*3/mm3 11.16* 15.75*   HEMOGLOBIN g/dL 10.1* 11.9   HEMATOCRIT % 33.5* 38.2   PLATELETS 10*3/mm3 165 188       Results from last 7 days  Lab Units 17  0639 17  0820 17  0527 17  1503   SODIUM mmol/L 138 136 134 133   POTASSIUM mmol/L 4.1 3.8 4.5 4.3   CHLORIDE mmol/L 103 101 101 96*   CO2 mmol/L 24.0 25.0 22.0 26.0   BUN mg/dL 36* 35* 28* 28*   CREATININE mg/dL 1.00 1.20 1.20 1.50*   GLUCOSE mg/dL 180* 239* 299* 149*   CALCIUM mg/dL 8.5* 9.1 7.9* 9.4   ALT (SGPT) U/L  --   --   --  20   AST (SGOT) U/L  --   --   --  25     BNP   Date Value Ref Range Status   2017 318.0 (H) 0.0 - 100.0 pg/mL Final     No results found for: PHART    Microbiology Results Abnormal     Procedure Component Value - Date/Time    Urine Culture - Urine, Urine, Clean Catch [[id number]]  (Abnormal)  (Susceptibility) Collected:  17  1557    Lab Status:  Final result Specimen:  Urine from Urine, Clean Catch Updated:  12/19/17 1206     Urine Culture --      >100,000 CFU/mL Escherichia coli (A)    Susceptibility      Escherichia coli     SUZANNE     Amikacin <=16 ug/ml Susceptible     Ampicillin >16 ug/ml Resistant     Ampicillin + Sulbactam >16/8 ug/ml Resistant     Aztreonam <=8 ug/ml Susceptible     Cefepime <=8 ug/ml Susceptible     Cefotaxime <=2 ug/ml Susceptible     Ceftriaxone <=8 ug/ml Susceptible     Cefuroxime sodium <=4 ug/ml Susceptible     Cephalothin >16 ug/ml Resistant     Ciprofloxacin >2 ug/ml Resistant     Ertapenem <=1 ug/ml Susceptible     Gentamicin >8 ug/ml Resistant     Levofloxacin >4 ug/ml Resistant     Meropenem <=1 ug/ml Susceptible     Nitrofurantoin <=32 ug/ml Susceptible     Piperacillin + Tazobactam <=16 ug/ml Susceptible     Tetracycline >8 ug/ml Resistant     Tobramycin >8 ug/ml Resistant     Trimethoprim + Sulfamethoxazole >2/38 ug/ml Resistant                    Influenza A & B, RT PCR - Swab, Nasopharynx [[id number]]  (Abnormal) Collected:  12/17/17 2221    Lab Status:  Final result Specimen:  Swab from Nasopharynx Updated:  12/18/17 0800     Influenza A PCR Detected (C)     Influenza B PCR Not Detected    Influenza Antigen, Rapid - Swab, Nasopharynx [[id number]]  (Normal) Collected:  12/17/17 1558    Lab Status:  Final result Specimen:  Swab from Nasopharynx Updated:  12/17/17 1627     Influenza A Ag, EIA Negative     Influenza B Ag, EIA Negative          Imaging Results (last 24 hours)     ** No results found for the last 24 hours. **        Results for orders placed during the hospital encounter of 12/17/17   Adult Transthoracic Echo Complete W/ Cont if Necessary Per Protocol    Narrative · Left ventricular systolic function is normal. Estimated EF = 65%.  · Mild to moderate tricuspid valve regurgitation is present.  · The left ventricular cavity is small.  · Left ventricular wall thickness is consistent with mild  concentric   hypertrophy.  · Normal right ventricular cavity size, wall thickness, systolic function   and septal motion noted.  · There is no evidence of pericardial effusion.  · No evidence of pulmonary hypertension is present.  · The aortic valve exhibits sclerosis.  · Left atrial volume is borderline increased.          I have reviewed the medications.    Assessment/Plan   Assessment / Plan     Hospital Problem List     * (Principal)Sepsis    Vitamin D deficiency    Corticoadrenal insufficiency    Overview Addendum 12/17/2017  8:11 PM by Irene Neal MD     on chronic steroids therapy         COPD (chronic obstructive pulmonary disease)    UTI (urinary tract infection)    Malaise    JANETT (acute kidney injury)    Advanced age    Diabetes mellitus    Overview Signed 12/17/2017  8:10 PM by Irene Neal MD     Type 2         Possible Influenza    Former smoker           Brief Hospital Course to date:  Ute Enrique is a 89 y.o. female with PMHX significant for adrenal insufficiency on chronic steroids, remote smoker, COPD, ADALGISA with CPAP use, hypertension, type 2 diabetes, CKD    Assessment & Plan:  - Acute hypoxemic respiratory failure: Resolved. Intermittently on oxygen, wean as tolerated  - Influenza A: Continue Tamiflu/complete course  - GNR UTI/POA: Ceftriaxone/completing course  - JANETT on CKD: Baseline Cr 1.1-1.2.  Improved  - New onset Afib/RVR, ECHO reviewed, rate controlled, possible ECV in 1-2 days, transitioned to apixaban  - Adrenal insufficiency on chronic steroids: Home cortisone 25 mg  - COPD: Pulmicort, ipratropium nebs, methylpred  - ADALGISA: Home CPAP   - Hypertension: Home Bisoprolol  - T2DM: A1C pending, titrating insulin  - Interested in rehab, should PT feels that is a better option for her    DVT Prophylaxis:  Eliquis    CODE STATUS: Full Code    Disposition: I expect the patient to be discharged home in 1-2 days    Titi Sigala MD  12/20/17  8:50 AM

## 2017-12-20 NOTE — PLAN OF CARE
Problem: Fall Risk (Adult)  Goal: Absence of Falls  Outcome: Ongoing (interventions implemented as appropriate)

## 2017-12-20 NOTE — PLAN OF CARE
Problem: Sepsis (Adult)  Goal: Signs and Symptoms of Listed Potential Problems Will be Absent or Manageable (Sepsis)  Outcome: Ongoing (interventions implemented as appropriate)   12/20/17 1557   Sepsis   Problems Assessed (Sepsis) all   Problems Present (Sepsis) situational response;glycemic control, impaired

## 2017-12-20 NOTE — PROGRESS NOTES
"Ute Enrique  [id number]  4/7/1926   LOS: 3 days   Patient Care Team:  Jessica Alex MD as PCP - General (Internal Medicine)    Chief Complaint:  SOB / FEVER / ATRIAL FIBRILLATION    Subjective     Generally comfortable flat in bed on nasal BiPAP.  She continues to relate coarse generally nonproductive cough and mild tachypnea and dyspnea but no current pleurisy, hemoptysis, nausea, emesis, headache, or focal motor-sensory changes.  Appetite remains acceptable.  She continues to note bipedal edema.  Overall, she states states \"I think I'm a little bit better.\"    Objective     Vital Sign Min/Max for last 24 hours  Temp  Min: 97.3 °F (36.3 °C)  Max: 97.9 °F (36.6 °C)   BP  Min: 107/59  Max: 134/65   Pulse  Min: 60  Max: 136   Resp  Min: 16  Max: 22   SpO2  Min: 94 %  Max: 96 %   Flow (L/min)  Min: 2  Max: 6        Last 2 weights    12/17/17  1428 12/17/17 1955   Weight: 63.5 kg (140 lb) 68 kg (150 lb)         Intake/Output Summary (Last 24 hours) at 12/20/17 0751  Last data filed at 12/20/17 0704   Gross per 24 hour   Intake              720 ml   Output              550 ml   Net              170 ml       Physical Exam:     General Appearance:    Alert, cooperative, in no acute distress   Lungs:     Scattered rhonchi and expiratory wheezes,respirations regular, even and unlabored    Heart:    Irregular and normal rate, variable S1 and S2, grade 1/6            murmur, no gallop, no rub, no click   Abdomen:  Extremities:   Soft, non-tender, bowel sounds audible x4    Trace - 1+ edema, normal range of motion   Pulses:   Pulses palpable and equal bilaterally      Results Review:     Results from last 7 days  Lab Units 12/20/17  0639 12/19/17  0820 12/18/17  0527   SODIUM mmol/L 138 136 134   POTASSIUM mmol/L 4.1 3.8 4.5   CHLORIDE mmol/L 103 101 101   CO2 mmol/L 24.0 25.0 22.0   BUN mg/dL 36* 35* 28*   CREATININE mg/dL 1.00 1.20 1.20   GLUCOSE mg/dL 180* 239* 299*   CALCIUM mg/dL 8.5* 9.1 7.9*       Results from " last 7 days  Lab Units 12/18/17  0527 12/17/17  1503   WBC 10*3/mm3 11.16* 15.75*   HEMOGLOBIN g/dL 10.1* 11.9   HEMATOCRIT % 33.5* 38.2   PLATELETS 10*3/mm3 165 188           Results from last 7 days  Lab Units 12/18/17  0527   HEMOGLOBIN A1C % 6.50*     · NO EKG / CXR.    · ECHO:  · Left ventricular systolic function is normal. Estimated EF = 65%.  · Mild to moderate tricuspid valve regurgitation is present.  · The left ventricular cavity is small.  · Left ventricular wall thickness is consistent with mild concentric hypertrophy.  · Normal right ventricular cavity size, wall thickness, systolic function and septal motion noted.  · There is no evidence of pericardial effusion.  · No evidence of pulmonary hypertension is present.  · The aortic valve exhibits sclerosis.  · Left atrial volume is borderline increased.    Medication Review: REVIEWED; IV diltiazem 5 mg/hour.    Assessment/Plan     Improving respiratory status with persistent atrial fibrillation with controlled ventricular response.  Would continue current intervention and in 2-3 days consider pursuing ECV.  Additionally, would consider altering subcutaneous Lovenox to apixaban 2.5 mg BID.    Principal Problem:    Sepsis  Active Problems:    Vitamin D deficiency    Corticoadrenal insufficiency    COPD (chronic obstructive pulmonary disease)    UTI (urinary tract infection)    Malaise    JANETT (acute kidney injury)    Advanced age    Diabetes mellitus    Possible Influenza    Former smoker        12/20/17  7:51 AM

## 2017-12-20 NOTE — PLAN OF CARE
Problem: Arrhythmia/Dysrhythmia (Symptomatic) (Adult)  Goal: Signs and Symptoms of Listed Potential Problems Will be Absent or Manageable (Arrhythmia/Dysrhythmia)  Outcome: Ongoing (interventions implemented as appropriate)   12/20/17 2743   Arrhythmia/Dysrhythmia (Symptomatic)   Problems Assessed (Arrhythmia/Dysrhythmia) all   Problems Present (Arrhythmia/Dysrhythmia) situational response;electrophysiological conduction defect

## 2017-12-20 NOTE — THERAPY TREATMENT NOTE
Acute Care - Physical Therapy Treatment Note  [facility]     Patient Name: Ute Enrique  : 1926  MRN: [id number]  Today's Date: 2017  Onset of Illness/Injury or Date of Surgery Date: 17  Date of Referral to PT: 17  Referring Physician: Samira Anna MD    Admit Date: 2017    Visit Dx:    ICD-10-CM ICD-9-CM   1. Urinary tract infection without hematuria, site unspecified N39.0 599.0   2. COPD exacerbation J44.1 491.21   3. Shortness of breath R06.02 786.05   4. Generalized weakness R53.1 780.79   5. Leukocytosis, unspecified type D72.829 288.60   6. Acute renal failure, unspecified acute renal failure type N17.9 584.9   7. Impaired functional mobility, balance, gait, and endurance Z74.09 V49.89   8. PAF (paroxysmal atrial fibrillation) I48.0 427.31     Patient Active Problem List   Diagnosis   • Hypertensive cardiovascular disease   • Dyslipidemia   • ADALGISA (obstructive sleep apnea)   • Peripheral neuropathy   • Vitamin D deficiency   • Kidney disease   • Peripheral vascular disease   • Osteoarthritis   • Osteopenia   • Hyperuricemia   • Corticoadrenal insufficiency   • COPD (chronic obstructive pulmonary disease)   • Sepsis   • UTI (urinary tract infection)   • Malaise   • JANETT (acute kidney injury)   • Advanced age   • Diabetes mellitus   • Possible Influenza   • Former smoker               Adult Rehabilitation Note       17 1500          Rehab Assessment/Intervention    Discipline physical therapist  -LM      Document Type therapy note (daily note)  -LM      Subjective Information agree to therapy;no complaints  -LM      Patient Effort, Rehab Treatment good  -LM      Symptoms Noted During/After Treatment none  -LM      Precautions/Limitations fall precautions;oxygen therapy device and L/min;other (see comments)   Droplet Precautions  -LM      Recorded by [LM] Orly Lehman, PT      Vital Signs    Pre Systolic BP Rehab 127  -LM      Pre Treatment Diastolic BP 66  -LM       Pretreatment Heart Rate (beats/min) 78  -LM      Posttreatment Heart Rate (beats/min) 75  -LM      Pre SpO2 (%) 96  -LM      O2 Delivery Pre Treatment supplemental O2   6L  -LM      Intra SpO2 (%) 85   s/p 150 feet  -LM      O2 Delivery Intra Treatment supplemental O2   6L  -LM      Post SpO2 (%) 94  -LM      O2 Delivery Post Treatment supplemental O2  -LM      Pre Patient Position Sitting  -LM      Intra Patient Position Standing  -LM      Post Patient Position Sitting  -LM      Recorded by [LM] Orly Lehman, PT      Pain Assessment    Pain Assessment 0-10  -LM      Pain Score 0  -LM      Post Pain Score 0  -LM      Recorded by [LM] Orly Lehman, PT      Cognitive Assessment/Intervention    Current Cognitive/Communication Assessment functional  -LM      Orientation Status oriented x 4  -LM      Follows Commands/Answers Questions 100% of the time  -LM      Personal Safety WNL/WFL  -LM      Personal Safety Interventions fall prevention program maintained;gait belt;nonskid shoes/slippers when out of bed;muscle strengthening facilitated  -LM      Recorded by [LM] Orly Lehman PT      Bed Mobility, Assessment/Treatment    Bed Mobility, Comment Up in chair  -LM      Recorded by [LM] Orly Lehman PT      Transfer Assessment/Treatment    Transfers, Sit-Stand [place] contact guard assist;verbal cues required  -LM      Transfers, Stand-Sit [place] contact guard assist;verbal cues required  -LM      Transfers, Sit-Stand-Sit, Assist Device rolling walker  -LM      Transfer, Comment Vcs for hand placement  -LM      Recorded by [LM] Orly Lehman PT      Gait Assessment/Treatment    Gait, [place] Level contact guard assist  -LM      Gait, Assistive Device rolling walker  -LM      Gait, Distance (Feet) 150  -LM      Gait, Gait Deviations valentine decreased;forward flexed posture  -LM      Gait, Safety Issues supplemental O2  -LM      Gait, Impairments strength decreased;impaired balance  -LM      Gait, Comment O2  on 6L decreased to 85% s/p 150 feet of amb - took 2 minutes of sitting and PLB to recover to 90%.  -LM      Recorded by [LM] Orly Lehman PT      Stairs Assessment/Treatment    Stairs, [place] Level not tested  -LM      Recorded by [LM] Orly Lehman PT      Motor Skills/Interventions    Additional Documentation Balance Skills Training (Group)  -LM      Recorded by [LM] Orly Lehman PT      Balance Skills Training    Sitting-Level of Assistance Distant supervision  -LM      Sitting-Balance Support Feet supported  -LM      Standing-Level of Assistance Contact guard  -LM      Static Standing Balance Support assistive device  -LM      Gait Balance-Level of Assistance Contact guard  -LM      Gait Balance Support assistive device  -LM      Recorded by [LM] Orly Lehman PT      Therapy Exercises    Bilateral Lower Extremities AROM:;10 reps;sitting;hip flexion;LAQ;glut sets;ankle pumps/circles   x10 hip abd/add with chair reclined  -LM      Recorded by [LM] Orly Lehman PT      Positioning and Restraints    Pre-Treatment Position sitting in chair/recliner  -LM      Post Treatment Position chair  -LM      In Chair reclined;call light within reach;encouraged to call for assist;exit alarm on;notified nsg  -LM      Recorded by [LM] Orly Lehman PT        User Key  (r) = Recorded By, (t) = Taken By, (c) = Cosigned By    Initials Name Effective Dates    JESSICA Lehman PT 06/15/16 -                 IP PT Goals       12/20/17 1500 12/19/17 0934       Transfer Training PT LTG    Transfer Training PT LTG, Date Established  12/19/17  -LM     Transfer Training PT LTG, Time to Achieve  2 wks  -LM     Transfer Training PT LTG, Activity Type  bed to chair /chair to bed  -LM     Transfer Training PT LTG, [place] Level  conditional independence  -LM     Transfer Training PT LTG, Assist Device  --   AAD  -LM     Transfer Training PT LTG, Outcome goal ongoing  -LM      Gait Training PT LTG    Gait Training Goal PT LTG, Date  Established  12/19/17  -LM     Gait Training Goal PT LTG, Time to Achieve  2 wks  -LM     Gait Training Goal PT LTG, [place] Level  conditional independence  -LM     Gait Training Goal PT LTG, Assist Device  --   AAD  -LM     Gait Training Goal PT LTG, Distance to Achieve  300 feet  -LM     Gait Training Goal PT LTG, Outcome goal ongoing  -LM      Stair Training PT LTG    Stair Training Goal PT LTG, Date Established  12/19/17  -LM     Stair Training Goal PT LTG, Time to Achieve  2 wks  -LM     Stair Training Goal PT LTG, Number of Steps  3 steps  -LM     Stair Training Goal PT LTG, [place] Level  supervision required  -LM     Stair Training Goal PT LTG, Assist Device  2 handrails  -LM     Stair Training Goal PT LTG, Outcome goal ongoing  -LM        User Key  (r) = Recorded By, (t) = Taken By, (c) = Cosigned By    Initials Name Provider Type    JESSICA Lehman PT Physical Therapist          Physical Therapy Education     Title: PT OT SLP Therapies (Active)     Topic: Physical Therapy (Active)     Point: Mobility training (Done)    Learning Progress Summary    Learner Readiness Method Response Comment Documented by Status   Patient Acceptance E VU,NR   12/20/17 1552 Done    Acceptance E NR Reviewed safety with transfers and ambulation.  Educated pt on PLB with activity.  12/19/17 1016 Active               Point: Precautions (Done)    Learning Progress Summary    Learner Readiness Method Response Comment Documented by Status   Patient Acceptance E VU,NR   12/20/17 1552 Done    Acceptance E NR Reviewed safety with transfers and ambulation.  Educated pt on PLB with activity.  12/19/17 1016 Active                      User Key     Initials Effective Dates Name Provider Type Discipline     06/15/16 -  Orly Lehman PT Physical Therapist PT                    PT Recommendation and Plan  Anticipated Discharge Disposition: inpatient rehabilitation facility  PT Frequency: daily  Plan of Care Review  Plan  Of Care Reviewed With: patient  Progress: progress toward functional goals as expected  Outcome Summary/Follow up Plan: Pt feeling better on this date.  Increased gait distance to 150 feet with CGA using rw.  O2 dropped to 85% with ambulation.  Pt progressing and will continue to benefit from skilled PT to improve mobility and safety.          Outcome Measures       12/20/17 1500 12/19/17 0934       How much help from another person do you currently need...    Turning from your back to your side while in flat bed without using bedrails? 3  -LM 3  -LM     Moving from lying on back to sitting on the side of a flat bed without bedrails? 3  -LM 3  -LM     Moving to and from a bed to a chair (including a wheelchair)? 3  -LM 3  -LM     Standing up from a chair using your arms (e.g., wheelchair, bedside chair)? 3  -LM 3  -LM     Climbing 3-5 steps with a railing? 3  -LM 2  -LM     To walk in hospital room? 3  -LM 3  -LM     AM-PAC 6 Clicks Score 18  -LM 17  -LM     Functional Assessment    Outcome Measure Options AM-PAC 6 Clicks Basic Mobility (PT)  -LM AM-PAC 6 Clicks Basic Mobility (PT)  -LM       User Key  (r) = Recorded By, (t) = Taken By, (c) = Cosigned By    Initials Name Provider Type    JESSICA Lehman PT Physical Therapist           Time Calculation:         PT Charges       12/20/17 1500          Time Calculation    Start Time 1500  -LM      PT Received On 12/20/17  -LM      PT Goal Re-Cert Due Date 12/29/17  -LM      Time Calculation- PT    Total Timed Code Minutes- PT 23 minute(s)  -LM        User Key  (r) = Recorded By, (t) = Taken By, (c) = Cosigned By    Initials Name Provider Type    JESSICA Lehman PT Physical Therapist          Therapy Charges for Today     Code Description Service Date Service Provider Modifiers Qty    97788000166 HC PT EVAL MOD COMPLEXITY 4 12/19/2017 Orly Lehman PT GP 1    85640313227 HC GAIT TRAINING EA 15 MIN 12/20/2017 Orly Lehman PT GP 1    78475537293 HC PT THER PROC EA 15  MIN 12/20/2017 Orly Lehman, PT GP 1          PT G-Codes  Outcome Measure Options: AM-PAC 6 Clicks Basic Mobility (PT)    Orly Lehman, PT  12/20/2017

## 2017-12-21 LAB
ANION GAP SERPL CALCULATED.3IONS-SCNC: 11 MMOL/L (ref 3–11)
BUN BLD-MCNC: 40 MG/DL (ref 9–23)
BUN/CREAT SERPL: 36.4 (ref 7–25)
CALCIUM SPEC-SCNC: 9.1 MG/DL (ref 8.7–10.4)
CHLORIDE SERPL-SCNC: 98 MMOL/L (ref 99–109)
CO2 SERPL-SCNC: 29 MMOL/L (ref 20–31)
CREAT BLD-MCNC: 1.1 MG/DL (ref 0.6–1.3)
DEPRECATED RDW RBC AUTO: 51 FL (ref 37–54)
ERYTHROCYTE [DISTWIDTH] IN BLOOD BY AUTOMATED COUNT: 16.9 % (ref 11.3–14.5)
GFR SERPL CREATININE-BSD FRML MDRD: 47 ML/MIN/1.73
GLUCOSE BLD-MCNC: 139 MG/DL (ref 70–100)
GLUCOSE BLDC GLUCOMTR-MCNC: 113 MG/DL (ref 70–130)
GLUCOSE BLDC GLUCOMTR-MCNC: 136 MG/DL (ref 70–130)
GLUCOSE BLDC GLUCOMTR-MCNC: 140 MG/DL (ref 70–130)
GLUCOSE BLDC GLUCOMTR-MCNC: 144 MG/DL (ref 70–130)
HCT VFR BLD AUTO: 33.3 % (ref 34.5–44)
HGB BLD-MCNC: 10.5 G/DL (ref 11.5–15.5)
MCH RBC QN AUTO: 25.6 PG (ref 27–31)
MCHC RBC AUTO-ENTMCNC: 31.5 G/DL (ref 32–36)
MCV RBC AUTO: 81.2 FL (ref 80–99)
PLATELET # BLD AUTO: 252 10*3/MM3 (ref 150–450)
PMV BLD AUTO: 10 FL (ref 6–12)
POTASSIUM BLD-SCNC: 3.7 MMOL/L (ref 3.5–5.5)
RBC # BLD AUTO: 4.1 10*6/MM3 (ref 3.89–5.14)
SODIUM BLD-SCNC: 138 MMOL/L (ref 132–146)
WBC NRBC COR # BLD: 7.48 10*3/MM3 (ref 3.5–10.8)

## 2017-12-21 PROCEDURE — 93005 ELECTROCARDIOGRAM TRACING: CPT | Performed by: INTERNAL MEDICINE

## 2017-12-21 PROCEDURE — 94799 UNLISTED PULMONARY SVC/PX: CPT

## 2017-12-21 PROCEDURE — 85027 COMPLETE CBC AUTOMATED: CPT

## 2017-12-21 PROCEDURE — 63710000001 INSULIN DETEMIR PER 5 UNITS: Performed by: HOSPITALIST

## 2017-12-21 PROCEDURE — 93010 ELECTROCARDIOGRAM REPORT: CPT | Performed by: INTERNAL MEDICINE

## 2017-12-21 PROCEDURE — 82962 GLUCOSE BLOOD TEST: CPT

## 2017-12-21 PROCEDURE — 94640 AIRWAY INHALATION TREATMENT: CPT

## 2017-12-21 PROCEDURE — 99233 SBSQ HOSP IP/OBS HIGH 50: CPT | Performed by: HOSPITALIST

## 2017-12-21 PROCEDURE — 97116 GAIT TRAINING THERAPY: CPT | Performed by: PHYSICAL THERAPIST

## 2017-12-21 PROCEDURE — 80048 BASIC METABOLIC PNL TOTAL CA: CPT

## 2017-12-21 PROCEDURE — 94760 N-INVAS EAR/PLS OXIMETRY 1: CPT

## 2017-12-21 PROCEDURE — 99232 SBSQ HOSP IP/OBS MODERATE 35: CPT | Performed by: INTERNAL MEDICINE

## 2017-12-21 RX ADMIN — CORTISONE ACETATE 25 MG: 25 TABLET ORAL at 17:37

## 2017-12-21 RX ADMIN — SOLIFENACIN SUCCINATE 5 MG: 5 TABLET, FILM COATED ORAL at 22:52

## 2017-12-21 RX ADMIN — APIXABAN 2.5 MG: 2.5 TABLET, FILM COATED ORAL at 08:54

## 2017-12-21 RX ADMIN — IPRATROPIUM BROMIDE AND ALBUTEROL SULFATE 3 ML: .5; 3 SOLUTION RESPIRATORY (INHALATION) at 19:37

## 2017-12-21 RX ADMIN — ARFORMOTEROL TARTRATE 15 MCG: 15 SOLUTION RESPIRATORY (INHALATION) at 19:37

## 2017-12-21 RX ADMIN — IPRATROPIUM BROMIDE AND ALBUTEROL SULFATE 3 ML: .5; 3 SOLUTION RESPIRATORY (INHALATION) at 08:31

## 2017-12-21 RX ADMIN — ROSUVASTATIN CALCIUM 5 MG: 10 TABLET, FILM COATED ORAL at 22:51

## 2017-12-21 RX ADMIN — APIXABAN 2.5 MG: 2.5 TABLET, FILM COATED ORAL at 22:51

## 2017-12-21 RX ADMIN — BUDESONIDE 0.5 MG: 0.5 INHALANT RESPIRATORY (INHALATION) at 08:31

## 2017-12-21 RX ADMIN — ARFORMOTEROL TARTRATE 15 MCG: 15 SOLUTION RESPIRATORY (INHALATION) at 08:31

## 2017-12-21 RX ADMIN — PANTOPRAZOLE SODIUM 40 MG: 40 TABLET, DELAYED RELEASE ORAL at 22:53

## 2017-12-21 RX ADMIN — BISOPROLOL FUMARATE 2.5 MG: 5 TABLET ORAL at 22:50

## 2017-12-21 RX ADMIN — VITAMIN D, TAB 1000IU (100/BT) 1000 UNITS: 25 TAB at 08:54

## 2017-12-21 RX ADMIN — BISOPROLOL FUMARATE 2.5 MG: 5 TABLET ORAL at 08:54

## 2017-12-21 RX ADMIN — INSULIN DETEMIR 16 UNITS: 100 INJECTION, SOLUTION SUBCUTANEOUS at 09:26

## 2017-12-21 RX ADMIN — TORSEMIDE 10 MG: 20 TABLET ORAL at 08:55

## 2017-12-21 RX ADMIN — CORTISONE ACETATE 25 MG: 25 TABLET ORAL at 08:55

## 2017-12-21 RX ADMIN — OSELTAMIVIR PHOSPHATE 30 MG: 30 CAPSULE ORAL at 22:51

## 2017-12-21 RX ADMIN — IPRATROPIUM BROMIDE AND ALBUTEROL SULFATE 3 ML: .5; 3 SOLUTION RESPIRATORY (INHALATION) at 16:18

## 2017-12-21 RX ADMIN — POLYETHYLENE GLYCOL 3350 17 G: 17 POWDER, FOR SOLUTION ORAL at 08:57

## 2017-12-21 RX ADMIN — ASPIRIN 81 MG: 81 TABLET, COATED ORAL at 08:54

## 2017-12-21 RX ADMIN — MONTELUKAST SODIUM 10 MG: 10 TABLET, FILM COATED ORAL at 22:51

## 2017-12-21 RX ADMIN — IPRATROPIUM BROMIDE AND ALBUTEROL SULFATE 3 ML: .5; 3 SOLUTION RESPIRATORY (INHALATION) at 12:35

## 2017-12-21 NOTE — PLAN OF CARE
Problem: Patient Care Overview (Adult)  Goal: Plan of Care Review  Outcome: Ongoing (interventions implemented as appropriate)   12/21/17 1525   Coping/Psychosocial Response Interventions   Plan Of Care Reviewed With patient   Patient Care Overview   Progress improving   Outcome Evaluation   Outcome Summary/Follow up Plan Pt progressing towards skilled PT goals. Pt transferred supine<-->sit modified independently, stood with SBA, and ambulated 226 feet and 184 feet with SBA/CGA. Pt will continue to benefit from skilled PT to improve mobility and safety prior to d/c.        Problem: Inpatient Physical Therapy  Goal: Transfer Training Goal 1 LTG- PT  Outcome: Ongoing (interventions implemented as appropriate)   12/19/17 0934 12/21/17 1525   Transfer Training PT LTG   Transfer Training PT LTG, Date Established 12/19/17 --    Transfer Training PT LTG, Time to Achieve 2 wks --    Transfer Training PT LTG, Activity Type bed to chair /chair to bed --    Transfer Training PT LTG, [place] Level conditional independence --    Transfer Training PT LTG, Assist Device (AAD) --    Transfer Training PT LTG, Outcome --  goal ongoing     Goal: Gait Training Goal LTG- PT  Outcome: Ongoing (interventions implemented as appropriate)   12/19/17 0934 12/21/17 1525   Gait Training PT LTG   Gait Training Goal PT LTG, Date Established 12/19/17 --    Gait Training Goal PT LTG, Time to Achieve 2 wks --    Gait Training Goal PT LTG, [place] Level conditional independence --    Gait Training Goal PT LTG, Assist Device (AAD) --    Gait Training Goal PT LTG, Distance to Achieve 300 feet --    Gait Training Goal PT LTG, Outcome --  goal ongoing     Goal: Stair Training Goal LTG- PT  Outcome: Ongoing (interventions implemented as appropriate)   12/19/17 0934 12/21/17 1525   Stair Training PT LTG   Stair Training Goal PT LTG, Date Established 12/19/17 --    Stair Training Goal PT LTG, Time to Achieve 2 wks --    Stair Training Goal PT LTG,  Number of Steps 3 steps --    Stair Training Goal PT LTG, [place] Level supervision required --    Stair Training Goal PT LTG, Assist Device 2 handrails --    Stair Training Goal PT LTG, Outcome --  goal ongoing

## 2017-12-21 NOTE — PLAN OF CARE
Problem: Patient Care Overview (Adult)  Goal: Plan of Care Review  Outcome: Ongoing (interventions implemented as appropriate)   12/21/17 0630   Coping/Psychosocial Response Interventions   Plan Of Care Reviewed With patient   Patient Care Overview   Progress progress toward functional goals is gradual   Outcome Evaluation   Outcome Summary/Follow up Plan Pt remians in afib but at lower, controlled rate. Cardizem was stopped around 530 am this shift. VSS and no complaints of pain.

## 2017-12-21 NOTE — PROGRESS NOTES
Continued Stay Note  [facility]     Patient Name: Ute Enrique  MRN: [id number]  Today's Date: 12/21/2017    Admit Date: 12/17/2017          Discharge Plan       12/21/17 1214    Case Management/Social Work Plan    Additional Comments Received consult to discuss rehab placement again with Ms. Enrique, Ms Herrera plan is to return home with 24/7 caregivers and home health pt and ot through [facility].  She states she has already arranged for the 24/7 sitters to be available when she is discharged.  She also has changed her mind on transportation and would like an [facility], we talked about the cost of the [facility] since she is able to sit upright and can stand and walk, she states she would just feel safer getting home and she would not have to worry about navigation stairs to get inside she is willing to pay the bill for [facility] if they send one.  CM will need a heads up on DC date to schedule [facility] they are not always available the same day you call.  Ms. Enrique states the sitter service she uses is a private company and was hesitant to give me any names, she states she used them for her  before he passed. CM will cont to follow.               Discharge Codes     None        Expected Discharge Date and Time     Expected Discharge Date Expected Discharge Time    Dec 21, 2017             Beverly Abraham RN

## 2017-12-21 NOTE — PROGRESS NOTES
"Ute Enrique  [id number]  4/7/1926   LOS: 4 days   Patient Care Team:  Jessica Alex MD as PCP - General (Internal Medicine)    Chief Complaint:  RESPIRATORY FAILURE / ATRIAL FIBRILLATION    Subjective     Comfortable this morning on nasal BiPAP therapy.  She denies anginal type chest discomfort, increased shortness of breath, nausea, emesis, abdominal pain, headache, or focal motor-sensory changes.  She states \"I'm a little bit better.\"  No hemoptysis or significant sputum production.    Objective     Vital Sign Min/Max for last 24 hours  Temp  Min: 96.1 °F (35.6 °C)  Max: 97.9 °F (36.6 °C)   BP  Min: 103/64  Max: 131/62   Pulse  Min: 63  Max: 79   Resp  Min: 15  Max: 20   SpO2  Min: 83 %  Max: 97 %   Flow (L/min)  Min: 2  Max: 2        Last 2 weights    12/17/17  1428 12/17/17  1955   Weight: 63.5 kg (140 lb) 68 kg (150 lb)         Intake/Output Summary (Last 24 hours) at 12/21/17 0722  Last data filed at 12/21/17 0430   Gross per 24 hour   Intake             1320 ml   Output             3500 ml   Net            -2180 ml       Physical Exam:     General Appearance:    Alert, cooperative, in no acute distress   Lungs:     Scattered rhonchi and expiratory wheezes,respirations regular, even and  unlabored    Heart:    Irregular and normal rate, normal S1 and S2, grade 1/6            murmur, no gallop, no rub, no click   Abdomen:  Extremities:   Soft, non-tender, bowel sounds audible x4    No edema, normal range of motion   Pulses:   Pulses palpable and equal bilaterally      Results Review:     Results from last 7 days  Lab Units 12/20/17  0639 12/19/17  0820 12/18/17  0527   SODIUM mmol/L 138 136 134   POTASSIUM mmol/L 4.1 3.8 4.5   CHLORIDE mmol/L 103 101 101   CO2 mmol/L 24.0 25.0 22.0   BUN mg/dL 36* 35* 28*   CREATININE mg/dL 1.00 1.20 1.20   GLUCOSE mg/dL 180* 239* 299*   CALCIUM mg/dL 8.5* 9.1 7.9*       Results from last 7 days  Lab Units 12/18/17  0527 12/17/17  1503   WBC 10*3/mm3 11.16* 15.75* "   HEMOGLOBIN g/dL 10.1* 11.9   HEMATOCRIT % 33.5* 38.2   PLATELETS 10*3/mm3 165 188           Results from last 7 days  Lab Units 12/18/17  0527   HEMOGLOBIN A1C % 6.50*     · NO CXR.    · EKG: Atrial fibrillation with controlled ventricular response in the absence of acute ST-T changes, LVH, or scar.    Medication Review: REVIEWED    Assessment/Plan     Generally improving course with acceptable rate control and now off IV diltiazem.  Blood pressure is acceptably controlled and she now is on a BX of band.  Discussed with her pursuing ECV including the procedure, risks, and potential complications and she is somewhat reluctant at this time.  We will ask her to be nothing by mouth except for medications after midnight today and hold a place for possible ECV in the morning if she does wish to pursue this intervention.  Would otherwise continue current cardiac medications.    Principal Problem:    Sepsis  Active Problems:    Vitamin D deficiency    Corticoadrenal insufficiency    COPD (chronic obstructive pulmonary disease)    UTI (urinary tract infection)    Malaise    JANETT (acute kidney injury)    Advanced age    Diabetes mellitus    Possible Influenza    Former smoker        12/21/17  7:22 AM

## 2017-12-21 NOTE — THERAPY TREATMENT NOTE
Acute Care - Physical Therapy Treatment Note  [facility]     Patient Name: Ute Enrique  : 1926  MRN: [id number]  Today's Date: 2017  Onset of Illness/Injury or Date of Surgery Date: 17  Date of Referral to PT: 17  Referring Physician: Samira Anna MD    Admit Date: 2017    Visit Dx:    ICD-10-CM ICD-9-CM   1. Urinary tract infection without hematuria, site unspecified N39.0 599.0   2. COPD exacerbation J44.1 491.21   3. Shortness of breath R06.02 786.05   4. Generalized weakness R53.1 780.79   5. Leukocytosis, unspecified type D72.829 288.60   6. Acute renal failure, unspecified acute renal failure type N17.9 584.9   7. Impaired functional mobility, balance, gait, and endurance Z74.09 V49.89   8. PAF (paroxysmal atrial fibrillation) I48.0 427.31   9. Vitamin D deficiency E55.9 268.9   10. Chronic obstructive pulmonary disease, unspecified COPD type J44.9 496   11. Hypertensive heart disease without heart failure I11.9 402.90     Patient Active Problem List   Diagnosis   • Hypertensive cardiovascular disease   • Dyslipidemia   • ADALGISA (obstructive sleep apnea)   • Peripheral neuropathy   • Vitamin D deficiency   • Kidney disease   • Peripheral vascular disease   • Osteoarthritis   • Osteopenia   • Hyperuricemia   • Corticoadrenal insufficiency   • COPD (chronic obstructive pulmonary disease)   • Sepsis   • UTI (urinary tract infection)   • Malaise   • JANETT (acute kidney injury)   • Advanced age   • Diabetes mellitus   • Possible Influenza   • Former smoker               Adult Rehabilitation Note       17 1525 17 1500       Rehab Assessment/Intervention    Discipline physical therapist  -LM physical therapist  -LM     Document Type therapy note (daily note)  -LM therapy note (daily note)  -LM     Subjective Information agree to therapy;no complaints  -LM agree to therapy;no complaints  -LM     Patient Effort, Rehab Treatment good  -LM good  -LM     Symptoms Noted  During/After Treatment fatigue  -LM none  -LM     Precautions/Limitations fall precautions;oxygen therapy device and L/min;other (see comments)   Droplet Precautions  -LM fall precautions;oxygen therapy device and L/min;other (see comments)   Droplet Precautions  -LM     Recorded by [LM] Orly Lehman PT [LM] Orly Lehman PT     Vital Signs    Pre Systolic BP Rehab 114  -  -LM     Pre Treatment Diastolic BP 68  -LM 66  -LM     Pretreatment Heart Rate (beats/min) 81  -LM 78  -LM     Posttreatment Heart Rate (beats/min) 84  -LM 75  -LM     Pre SpO2 (%)  96  -LM     O2 Delivery Pre Treatment  supplemental O2   6L  -LM     Intra SpO2 (%)  85   s/p 150 feet  -LM     O2 Delivery Intra Treatment  supplemental O2   6L  -LM     Post SpO2 (%)  94  -LM     O2 Delivery Post Treatment  supplemental O2  -LM     Pre Patient Position Supine  -LM Sitting  -LM     Intra Patient Position Standing  -LM Standing  -LM     Post Patient Position Supine  -LM Sitting  -LM     Recorded by [LM] Orly Lehman PT [LM] Orly Lehman PT     Pain Assessment    Pain Assessment 0-10  -LM 0-10  -LM     Pain Score 0  -LM 0  -LM     Post Pain Score 0  -LM 0  -LM     Recorded by [LM] Orly Lehman PT [LM] Orly Lehman PT     Cognitive Assessment/Intervention    Current Cognitive/Communication Assessment functional  -LM functional  -LM     Orientation Status oriented x 4  -LM oriented x 4  -LM     Follows Commands/Answers Questions 100% of the time  -% of the time  -LM     Personal Safety WNL/WFL  -LM WNL/WFL  -LM     Personal Safety Interventions fall prevention program maintained;gait belt;nonskid shoes/slippers when out of bed  -LM fall prevention program maintained;gait belt;nonskid shoes/slippers when out of bed;muscle strengthening facilitated  -LM     Recorded by [LM] Orly Lehman PT [LM] Orly Lehman PT     Bed Mobility, Assessment/Treatment    Bed Mobility, Assistive Device bed rails;head of bed elevated  -LM      Bed Mob, Supine  to Sit, [place] conditional independence  -LM      Bed Mob, Sit to Supine, [place] conditional independence  -LM      Bed Mobility, Comment  Up in chair  -LM     Recorded by [LM] Orly Lehman PT [LM] Orly Lehman PT     Transfer Assessment/Treatment    Transfers, Sit-Stand [place] supervision required;verbal cues required  -LM contact guard assist;verbal cues required  -LM     Transfers, Stand-Sit [place] supervision required;verbal cues required  -LM contact guard assist;verbal cues required  -LM     Transfers, Sit-Stand-Sit, Assist Device rolling walker  -LM rolling walker  -LM     Transfer, Comment  Vcs for hand placement  -LM     Recorded by [LM] Orly Lehman, PT [LM] Orly Lehman, PT     Gait Assessment/Treatment    Gait, [place] Level contact guard assist   CGA on turns; SBA when ambulating straight path  -LM contact guard assist  -LM     Gait, Assistive Device rolling walker  -LM rolling walker  -LM     Gait, Distance (Feet) 226   1st - 226 feet; 2nd - 184 feet  -  -LM     Gait, Gait Deviations valentine decreased  -LM valentine decreased;forward flexed posture  -LM     Gait, Safety Issues supplemental O2  -LM supplemental O2  -LM     Gait, Impairments strength decreased;impaired balance  -LM strength decreased;impaired balance  -LM     Gait, Comment Pt now on 4L this date.  Pt SOA post gait but recovered within a few minutes of sitting with PLB.  -LM O2 on 6L decreased to 85% s/p 150 feet of amb - took 2 minutes of sitting and PLB to recover to 90%.  -LM     Recorded by [LM] Orly Lehman, PT [LM] Orly Lehman PT     Stairs Assessment/Treatment    Stairs, [place] Level  not tested  -LM     Recorded by  [LM] Orly Lehman PT     Motor Skills/Interventions    Additional Documentation  Balance Skills Training (Group)  -LM     Recorded by  [LM] Orly Lehman PT     Balance Skills Training    Sitting-Level of Assistance Independent  -LM Distant supervision  -LM      Sitting-Balance Support Feet supported  -LM Feet supported  -LM     Standing-Level of Assistance Close supervision  -LM Contact guard  -LM     Static Standing Balance Support assistive device  -LM assistive device  -LM     Gait Balance-Level of Assistance Contact guard  -LM Contact guard  -LM     Gait Balance Support assistive device  -LM assistive device  -LM     Recorded by [LM] Orly Lehman PT [LM] Orly Lehman PT     Therapy Exercises    Bilateral Lower Extremities  AROM:;10 reps;sitting;hip flexion;LAQ;glut sets;ankle pumps/circles   x10 hip abd/add with chair reclined  -LM     Recorded by  [LM] Orly Lehman PT     Positioning and Restraints    Pre-Treatment Position in bed  -LM sitting in chair/recliner  -LM     Post Treatment Position bed  -LM chair  -LM     In Bed supine;call light within reach;encouraged to call for assist;exit alarm on;notified nsg  -LM      In Chair  reclined;call light within reach;encouraged to call for assist;exit alarm on;notified nsg  -LM     Recorded by [LM] Orly Lehman, PT [LM] Orly Lehman PT       User Key  (r) = Recorded By, (t) = Taken By, (c) = Cosigned By    Initials Name Effective Dates    LM Orly Lehman PT 06/15/16 -                 IP PT Goals       12/21/17 1525 12/20/17 1500 12/19/17 0934    Transfer Training PT LTG    Transfer Training PT LTG, Date Established   12/19/17  -LM    Transfer Training PT LTG, Time to Achieve   2 wks  -LM    Transfer Training PT LTG, Activity Type   bed to chair /chair to bed  -LM    Transfer Training PT LTG, [place] Level   conditional independence  -LM    Transfer Training PT LTG, Assist Device   --   AAD  -LM    Transfer Training PT LTG, Outcome goal ongoing  -LM goal ongoing  -LM     Gait Training PT LTG    Gait Training Goal PT LTG, Date Established   12/19/17  -LM    Gait Training Goal PT LTG, Time to Achieve   2 wks  -LM    Gait Training Goal PT LTG, [place] Level   conditional independence  -LM    Gait Training Goal  PT LTG, Assist Device   --   AAD  -LM    Gait Training Goal PT LTG, Distance to Achieve   300 feet  -LM    Gait Training Goal PT LTG, Outcome goal ongoing  -LM goal ongoing  -LM     Stair Training PT LTG    Stair Training Goal PT LTG, Date Established   12/19/17  -LM    Stair Training Goal PT LTG, Time to Achieve   2 wks  -LM    Stair Training Goal PT LTG, Number of Steps   3 steps  -LM    Stair Training Goal PT LTG, [place] Level   supervision required  -LM    Stair Training Goal PT LTG, Assist Device   2 handrails  -LM    Stair Training Goal PT LTG, Outcome goal ongoing  -LM goal ongoing  -LM       User Key  (r) = Recorded By, (t) = Taken By, (c) = Cosigned By    Initials Name Provider Type    LM Orly Lehman PT Physical Therapist          Physical Therapy Education     Title: PT OT SLP Therapies (Active)     Topic: Physical Therapy (Active)     Point: Mobility training (Done)    Learning Progress Summary    Learner Readiness Method Response Comment Documented by Status   Patient Acceptance E VU,NR   12/21/17 1609 Done    Acceptance E VU,NR   12/20/17 1552 Done    Acceptance E NR Reviewed safety with transfers and ambulation.  Educated pt on PLB with activity.  12/19/17 1016 Active               Point: Precautions (Done)    Learning Progress Summary    Learner Readiness Method Response Comment Documented by Status   Patient Acceptance E VU,NR  LM 12/21/17 1609 Done    Acceptance E VU,NR  LM 12/20/17 1552 Done    Acceptance E NR Reviewed safety with transfers and ambulation.  Educated pt on PLB with activity.  12/19/17 1016 Active                      User Key     Initials Effective Dates Name Provider Type Discipline     06/15/16 -  Orly Lehman PT Physical Therapist PT                    PT Recommendation and Plan  Anticipated Discharge Disposition: inpatient rehabilitation facility  PT Frequency: daily  Plan of Care Review  Plan Of Care Reviewed With: patient  Progress: improving  Outcome  Summary/Follow up Plan: Pt progressing towards skilled PT goals.  Pt transferred supine<-->sit modified independently, stood with SBA, and ambulated 226 feet and 184 feet with SBA/CGA. Pt will continue to benefit from skilled PT to improve mobility and safety prior to d/c.           Outcome Measures       12/21/17 1525 12/20/17 1500 12/19/17 0934    How much help from another person do you currently need...    Turning from your back to your side while in flat bed without using bedrails? 4  -LM 3  -LM 3  -LM    Moving from lying on back to sitting on the side of a flat bed without bedrails? 4  -LM 3  -LM 3  -LM    Moving to and from a bed to a chair (including a wheelchair)? 3  -LM 3  -LM 3  -LM    Standing up from a chair using your arms (e.g., wheelchair, bedside chair)? 3  -LM 3  -LM 3  -LM    Climbing 3-5 steps with a railing? 3  -LM 3  -LM 2  -LM    To walk in hospital room? 3  -LM 3  -LM 3  -LM    AM-PAC 6 Clicks Score 20  -LM 18  -LM 17  -LM    Functional Assessment    Outcome Measure Options AM-PAC 6 Clicks Basic Mobility (PT)  -LM AM-PAC 6 Clicks Basic Mobility (PT)  -LM AM-PAC 6 Clicks Basic Mobility (PT)  -LM      User Key  (r) = Recorded By, (t) = Taken By, (c) = Cosigned By    Initials Name Provider Type    JESSICA Lehman PT Physical Therapist           Time Calculation:         PT Charges       12/21/17 1525          Time Calculation    Start Time 1525  -LM      PT Received On 12/21/17  -LM      PT Goal Re-Cert Due Date 12/29/17  -LM      Time Calculation- PT    Total Timed Code Minutes- PT 33 minute(s)  -LM        User Key  (r) = Recorded By, (t) = Taken By, (c) = Cosigned By    Initials Name Provider Type    JESSICA Lehman PT Physical Therapist          Therapy Charges for Today     Code Description Service Date Service Provider Modifiers Qty    10196227045 HC GAIT TRAINING EA 15 MIN 12/20/2017 Orly Lehman, PT GP 1    72506477207 HC PT THER PROC EA 15 MIN 12/20/2017 Orly eLhman PT GP 1     [id number]  GAIT TRAINING EA 15 MIN 12/21/2017 Orly Lehman, PT GP 2          PT G-Codes  Outcome Measure Options: AM-PAC 6 Clicks Basic Mobility (PT)    Orly Lehman, PT  12/21/2017

## 2017-12-21 NOTE — PROGRESS NOTES
[facility] Medicine Services  PROGRESS NOTE    Patient Name: Ute Enrique  : 1926  MRN: [id number]    Date of Admission: 2017  Length of Stay: 4  Primary Care Physician: Jessica Alex MD    Subjective   Subjective     CC:  Dyspnea    HPI:  Less short of breath. Less weak, feeling better. Dry cough only now. No f/c. Walked with PT and did fairly well yesterday. Overall much better.    Review of Systems  Gen- No fevers, chills  CV- No chest pain, palpitations  Resp- No cough, dyspnea  GI- No N/V/D, abd pain    Otherwise ROS is negative except as mentioned in the HPI.    Objective   Objective     Vital Signs:   Temp:  [96.1 °F (35.6 °C)-97.9 °F (36.6 °C)] 97.1 °F (36.2 °C)  Heart Rate:  [63-79] 73  Resp:  [15-20] 20  BP: (103-131)/(61-74) 124/63        Physical Exam:      NAD, appears younger than stated age  OP clear, MMM  Neck supple  RRR this am  Rales at bases, non-labored  +BS, ND, NT  STYLES  No c/c/e  No rashes  Normal affect    Results Reviewed:  I have personally reviewed current lab, radiology, and data and agree.      Results from last 7 days  Lab Units 17  0527 17  1503   WBC 10*3/mm3 11.16* 15.75*   HEMOGLOBIN g/dL 10.1* 11.9   HEMATOCRIT % 33.5* 38.2   PLATELETS 10*3/mm3 165 188       Results from last 7 days  Lab Units 17  0639 17  0820 17  0527 17  1503   SODIUM mmol/L 138 136 134 133   POTASSIUM mmol/L 4.1 3.8 4.5 4.3   CHLORIDE mmol/L 103 101 101 96*   CO2 mmol/L 24.0 25.0 22.0 26.0   BUN mg/dL 36* 35* 28* 28*   CREATININE mg/dL 1.00 1.20 1.20 1.50*   GLUCOSE mg/dL 180* 239* 299* 149*   CALCIUM mg/dL 8.5* 9.1 7.9* 9.4   ALT (SGPT) U/L  --   --   --  20   AST (SGOT) U/L  --   --   --  25     No results found for: BNP  No results found for: PHART    Microbiology Results Abnormal     Procedure Component Value - Date/Time    Urine Culture - Urine, Urine, Clean Catch [[id number]]  (Abnormal)  (Susceptibility) Collected:   12/17/17 1557    Lab Status:  Final result Specimen:  Urine from Urine, Clean Catch Updated:  12/19/17 1206     Urine Culture --      >100,000 CFU/mL Escherichia coli (A)    Susceptibility      Escherichia coli     SUZANNE     Amikacin <=16 ug/ml Susceptible     Ampicillin >16 ug/ml Resistant     Ampicillin + Sulbactam >16/8 ug/ml Resistant     Aztreonam <=8 ug/ml Susceptible     Cefepime <=8 ug/ml Susceptible     Cefotaxime <=2 ug/ml Susceptible     Ceftriaxone <=8 ug/ml Susceptible     Cefuroxime sodium <=4 ug/ml Susceptible     Cephalothin >16 ug/ml Resistant     Ciprofloxacin >2 ug/ml Resistant     Ertapenem <=1 ug/ml Susceptible     Gentamicin >8 ug/ml Resistant     Levofloxacin >4 ug/ml Resistant     Meropenem <=1 ug/ml Susceptible     Nitrofurantoin <=32 ug/ml Susceptible     Piperacillin + Tazobactam <=16 ug/ml Susceptible     Tetracycline >8 ug/ml Resistant     Tobramycin >8 ug/ml Resistant     Trimethoprim + Sulfamethoxazole >2/38 ug/ml Resistant                    Influenza A & B, RT PCR - Swab, Nasopharynx [[id number]]  (Abnormal) Collected:  12/17/17 2221    Lab Status:  Final result Specimen:  Swab from Nasopharynx Updated:  12/18/17 0800     Influenza A PCR Detected (C)     Influenza B PCR Not Detected    Influenza Antigen, Rapid - Swab, Nasopharynx [[id number]]  (Normal) Collected:  12/17/17 1558    Lab Status:  Final result Specimen:  Swab from Nasopharynx Updated:  12/17/17 1627     Influenza A Ag, EIA Negative     Influenza B Ag, EIA Negative          Imaging Results (last 24 hours)     ** No results found for the last 24 hours. **        Results for orders placed during the hospital encounter of 12/17/17   Adult Transthoracic Echo Complete W/ Cont if Necessary Per Protocol    Narrative · Left ventricular systolic function is normal. Estimated EF = 65%.  · Mild to moderate tricuspid valve regurgitation is present.  · The left ventricular cavity is small.  · Left ventricular wall thickness is consistent  with mild concentric   hypertrophy.  · Normal right ventricular cavity size, wall thickness, systolic function   and septal motion noted.  · There is no evidence of pericardial effusion.  · No evidence of pulmonary hypertension is present.  · The aortic valve exhibits sclerosis.  · Left atrial volume is borderline increased.          I have reviewed the medications.    Assessment/Plan   Assessment / Plan     Hospital Problem List     * (Principal)Sepsis    Vitamin D deficiency    Corticoadrenal insufficiency    Overview Addendum 12/17/2017  8:11 PM by Irene Neal MD     on chronic steroids therapy         COPD (chronic obstructive pulmonary disease)    UTI (urinary tract infection)    Malaise    JANETT (acute kidney injury)    Advanced age    Diabetes mellitus    Overview Signed 12/17/2017  8:10 PM by Irene Neal MD     Type 2         Possible Influenza    Former smoker           Brief Hospital Course to date:  Ute Enrique is a 89 y.o. female with PMHX significant for adrenal insufficiency on chronic steroids, remote smoker, COPD, ADALGISA with CPAP use, hypertension, type 2 diabetes, CKD    Assessment & Plan:  - Acute hypoxemic respiratory failure: Resolved. Intermittently on oxygen, wean as tolerated  - Influenza A: Continue Tamiflu/complete course  - GNR UTI/POA: Ceftriaxone/completing course  - JANETT on CKD: Baseline Cr 1.1-1.2.  Improved  - New onset Afib/RVR, ECHO reviewed, rate controlled, seemingly still in afib on monitor though rate controlled, not confirmed by EKG today, on apixaban, possible cardioversion per cardiology v medical management  - Adrenal insufficiency on chronic steroids: Home cortisone 25 mg  - COPD: Pulmicort, ipratropium nebs, methylpred  - ADALGISA: Home CPAP   - Hypertension: Home Bisoprolol  - T2DM: A1C pending, titrating insulin  - Interested in rehab, should PT feels that is a better option for her    DVT Prophylaxis:  Eliquis    CODE STATUS: Full Code    Disposition: I expect the patient to  be discharged home in 1-2 days    Titi Sigala MD  12/21/17  7:07 AM

## 2017-12-22 ENCOUNTER — APPOINTMENT (OUTPATIENT)
Dept: CARDIOLOGY | Facility: HOSPITAL | Age: 82
End: 2017-12-22
Attending: INTERNAL MEDICINE

## 2017-12-22 VITALS
OXYGEN SATURATION: 93 % | RESPIRATION RATE: 16 BRPM | SYSTOLIC BLOOD PRESSURE: 130 MMHG | HEART RATE: 74 BPM | HEIGHT: 61 IN | TEMPERATURE: 97.8 F | DIASTOLIC BLOOD PRESSURE: 62 MMHG | WEIGHT: 150 LBS | BODY MASS INDEX: 28.32 KG/M2

## 2017-12-22 PROBLEM — I48.91 ATRIAL FIBRILLATION (HCC): Status: ACTIVE | Noted: 2017-12-22

## 2017-12-22 LAB
GLUCOSE BLDC GLUCOMTR-MCNC: 74 MG/DL (ref 70–130)
GLUCOSE BLDC GLUCOMTR-MCNC: 74 MG/DL (ref 70–130)
GLUCOSE BLDC GLUCOMTR-MCNC: 75 MG/DL (ref 70–130)

## 2017-12-22 PROCEDURE — 99232 SBSQ HOSP IP/OBS MODERATE 35: CPT | Performed by: INTERNAL MEDICINE

## 2017-12-22 PROCEDURE — 93005 ELECTROCARDIOGRAM TRACING: CPT | Performed by: INTERNAL MEDICINE

## 2017-12-22 PROCEDURE — 94640 AIRWAY INHALATION TREATMENT: CPT

## 2017-12-22 PROCEDURE — 92960 CARDIOVERSION ELECTRIC EXT: CPT | Performed by: INTERNAL MEDICINE

## 2017-12-22 PROCEDURE — 92960 CARDIOVERSION ELECTRIC EXT: CPT

## 2017-12-22 PROCEDURE — 99239 HOSP IP/OBS DSCHRG MGMT >30: CPT | Performed by: HOSPITALIST

## 2017-12-22 PROCEDURE — 94799 UNLISTED PULMONARY SVC/PX: CPT

## 2017-12-22 PROCEDURE — 82962 GLUCOSE BLOOD TEST: CPT

## 2017-12-22 PROCEDURE — 94760 N-INVAS EAR/PLS OXIMETRY 1: CPT

## 2017-12-22 PROCEDURE — 5A2204Z RESTORATION OF CARDIAC RHYTHM, SINGLE: ICD-10-PCS | Performed by: INTERNAL MEDICINE

## 2017-12-22 RX ORDER — BISOPROLOL FUMARATE 5 MG/1
5 TABLET, FILM COATED ORAL DAILY
Qty: 30 TABLET | Refills: 5 | Status: SHIPPED | OUTPATIENT
Start: 2017-12-22 | End: 2018-06-21

## 2017-12-22 RX ORDER — FENTANYL CITRATE 50 UG/ML
INJECTION, SOLUTION INTRAMUSCULAR; INTRAVENOUS
Status: DISCONTINUED
Start: 2017-12-22 | End: 2017-12-22 | Stop reason: WASHOUT

## 2017-12-22 RX ORDER — MIDAZOLAM HYDROCHLORIDE 1 MG/ML
INJECTION INTRAMUSCULAR; INTRAVENOUS
Status: DISCONTINUED
Start: 2017-12-22 | End: 2017-12-22 | Stop reason: WASHOUT

## 2017-12-22 RX ORDER — TORSEMIDE 5 MG/1
5 TABLET ORAL DAILY
Qty: 30 TABLET | Refills: 1 | Status: SHIPPED | OUTPATIENT
Start: 2017-12-23 | End: 2018-06-21

## 2017-12-22 RX ORDER — FLUMAZENIL 0.1 MG/ML
INJECTION INTRAVENOUS
Status: DISCONTINUED
Start: 2017-12-22 | End: 2017-12-22 | Stop reason: WASHOUT

## 2017-12-22 RX ORDER — NALOXONE HYDROCHLORIDE 0.4 MG/ML
INJECTION, SOLUTION INTRAMUSCULAR; INTRAVENOUS; SUBCUTANEOUS
Status: DISCONTINUED
Start: 2017-12-22 | End: 2017-12-22 | Stop reason: WASHOUT

## 2017-12-22 RX ADMIN — TORSEMIDE 10 MG: 20 TABLET ORAL at 10:58

## 2017-12-22 RX ADMIN — APIXABAN 2.5 MG: 2.5 TABLET, FILM COATED ORAL at 08:22

## 2017-12-22 RX ADMIN — IPRATROPIUM BROMIDE AND ALBUTEROL SULFATE 3 ML: .5; 3 SOLUTION RESPIRATORY (INHALATION) at 07:33

## 2017-12-22 RX ADMIN — ASPIRIN 81 MG: 81 TABLET, COATED ORAL at 08:22

## 2017-12-22 RX ADMIN — VITAMIN D, TAB 1000IU (100/BT) 1000 UNITS: 25 TAB at 10:59

## 2017-12-22 RX ADMIN — BISOPROLOL FUMARATE 2.5 MG: 5 TABLET ORAL at 10:59

## 2017-12-22 RX ADMIN — METHOHEXITAL SODIUM 30 MG: 500 INJECTION, POWDER, LYOPHILIZED, FOR SOLUTION INTRAMUSCULAR; INTRAVENOUS; RECTAL at 09:09

## 2017-12-22 RX ADMIN — ARFORMOTEROL TARTRATE 15 MCG: 15 SOLUTION RESPIRATORY (INHALATION) at 07:34

## 2017-12-22 RX ADMIN — BUDESONIDE 0.5 MG: 0.5 INHALANT RESPIRATORY (INHALATION) at 07:34

## 2017-12-22 RX ADMIN — CORTISONE ACETATE 25 MG: 25 TABLET ORAL at 11:00

## 2017-12-22 NOTE — DISCHARGE INSTR - OTHER ORDERS
Your walker was ordered from [employer]. Please call [phone] with questions or concerns about walker.

## 2017-12-22 NOTE — PROGRESS NOTES
Continued Stay Note  [facility]     Patient Name: Ute Enrique  MRN: [id number]  Today's Date: 12/22/2017    Admit Date: 12/17/2017          Discharge Plan       12/22/17 1215    Case Management/Social Work Plan    Additional Comments Spoke with Ms. Enrique multiple times this am about discharge planning, she would like to be transported by [facility] but she does not qualify for an ambulance she is walking 200+ feet and is able to sit in a wc.  This CM called [facility] for a quote which was $845.09, spoke with Ms. Enrique she is trying to arrange her own transportation, she has a friend in the room who states she will take her home and will assist her inside with the help of the 24/7 sitter that Ms. Enrique has already arranged to meet her at her house.  [facility] called and given DC date and DC summary faxed to them at [phone] spoke with Lisa.  Ms. Enrique expressed needing a rw, order sent to [facility] with Nancy to be delivered to room before DC.  No other needs identified. CM will cont to follow.               Discharge Codes     None        Expected Discharge Date and Time     Expected Discharge Date Expected Discharge Time    Dec 22, 2017             Beverly Abraham RN

## 2017-12-22 NOTE — DISCHARGE SUMMARY
[facility] Medicine Services  DISCHARGE SUMMARY    Patient Name: Ute Enrique  : 1926  MRN: [id number]    Date of Admission: 2017  Date of Discharge:  2017  Primary Care Physician: Jessica Alex MD    Consults     Date and Time Order Name Status Description    2017 0818 Inpatient Consult to Cardiology Completed         Hospital Course     Presenting Problem:   Urinary tract infection without hematuria, site unspecified [N39.0]    Active Hospital Problems (** Indicates Principal Problem)    Diagnosis Date Noted   • **Sepsis [A41.9] 2017   • Atrial fibrillation [I48.91] 2017   • UTI (urinary tract infection) [N39.0] 2017   • Malaise [R53.81] 2017   • JANETT (acute kidney injury) [N17.9] 2017   • Advanced age [R54] 2017   • Diabetes mellitus [E11.9] 2017   • Possible Influenza [J11.1] 2017   • Former smoker [Z87.891] 2017   • COPD (chronic obstructive pulmonary disease) [J44.9]    • Corticoadrenal insufficiency [E27.40]    • Vitamin D deficiency [E55.9]       Resolved Hospital Problems    Diagnosis Date Noted Date Resolved   No resolved problems to display.          Hospital Course:  Ute Enrique is a 89 y.o. female with adrenal insufficiency on chronic steroids here with acute hypoxic respiratory failure secondary to influenza and an UTI/POA s/p antibiotic therapy. She is s/p treatment of influenza with supportive care and Tamiflu.  She had JANETT which resolved with IVF. She was ready for discharge, but acutely developed atrial fibrillation with RVR. She was placed on cardizem and eventually cardioverted successfully to NSR. Regardless, she is rate controlled and now on apixaban. She continues to have intermittent hypoxia, and will be placed on oxygen with close PCP follow up. She is ready for discharge now and comfortable.  She has HH and access to 24 hour caregivers at home.           Day of Discharge      HPI:   Continues with sinus/nasal congestion.     Review of Systems   Constitutional: Positive for activity change, appetite change and fatigue.   HENT: Positive for congestion, rhinorrhea and sinus pressure.    Respiratory: Negative.    Cardiovascular: Positive for palpitations.   Gastrointestinal: Negative.    Genitourinary: Negative.    Neurological: Negative.          Otherwise ROS is negative except as mentioned in the HPI.    Vital Signs:   Temp:  [96.5 °F (35.8 °C)-97.8 °F (36.6 °C)] 97.8 °F (36.6 °C)  Heart Rate:  [] 74  Resp:  [16-20] 16  BP: ()/() 130/62     Physical Exam:  NAD, appears younger than stated age  OP clear, MMM  Neck supple  RRR this am  Rales at bases, non-labored  +BS, ND, NT  STYLES  No c/c/e  No rashes  Normal affect       [facility]  [address] [address] [phone]                 Ute Enrique   Cardioversion External in Cardiology Department   Order# [id number]    Reading physician: Tito Duffy III, MD   Ordering physician: Tito Duffy III, MD   Study date: 17         Patient Information      Patient Name MRN Sex  (Age)     Ute Enrique [id number] Female 1926 (89 y.o.)       Admission Information      Admission Date/Time Discharge Date/Time Room/Bed     17  1416  N608/1       Interpretation Summary      · Post cardioversion the patient displayed a sinus rhythm.  · The cardioversion was successful.               [facility] NONINVASIVE LAB  [address] [address] [phone]                 Ute Enrique   Echocardiogram   Order# [id number]    Reading physician: Erik Walters MD   Ordering physician: Titi Sigala MD   Study date: 17         Patient Information      Patient Name MRN Sex  (Age)     Ute Enrique [id number] Female 1926 (89 y.o.)       Admission Information      Admission Date/Time Discharge Date/Time Room/Bed      12/17/17  1416  N608/1       Sedation Narrator Report      Sedation Narrator Report       Interpretation Summary      · Left ventricular systolic function is normal. Estimated EF = 65%.  · Mild to moderate tricuspid valve regurgitation is present.  · The left ventricular cavity is small.  · Left ventricular wall thickness is consistent with mild concentric hypertrophy.  · Normal right ventricular cavity size, wall thickness, systolic function and septal motion noted.  · There is no evidence of pericardial effusion.  · No evidence of pulmonary hypertension is present.  · The aortic valve exhibits sclerosis.  · Left atrial volume is borderline increased.       Pertinent  and/or Most Recent Results       Results from last 7 days  Lab Units 12/21/17  0559 12/20/17  0639 12/19/17  0820 12/18/17  0527 12/17/17  1503   WBC 10*3/mm3 7.48  --   --  11.16* 15.75*   HEMOGLOBIN g/dL 10.5*  --   --  10.1* 11.9   HEMATOCRIT % 33.3*  --   --  33.5* 38.2   PLATELETS 10*3/mm3 252  --   --  165 188   SODIUM mmol/L 138 138 136 134 133   POTASSIUM mmol/L 3.7 4.1 3.8 4.5 4.3   CHLORIDE mmol/L 98* 103 101 101 96*   CO2 mmol/L 29.0 24.0 25.0 22.0 26.0   BUN mg/dL 40* 36* 35* 28* 28*   CREATININE mg/dL 1.10 1.00 1.20 1.20 1.50*   GLUCOSE mg/dL 139* 180* 239* 299* 149*   CALCIUM mg/dL 9.1 8.5* 9.1 7.9* 9.4       Results from last 7 days  Lab Units 12/17/17  1503   BILIRUBIN mg/dL 1.1   ALK PHOS U/L 56   ALT (SGPT) U/L 20   AST (SGOT) U/L 25           Invalid input(s): TG, LDLREALC    Results from last 7 days  Lab Units 12/18/17  0527 12/17/17  1503   HEMOGLOBIN A1C % 6.50*  --    BNP pg/mL  --  318.0*     Brief Urine Lab Results  (Last result in the past 365 days)      Color   Clarity   Blood   Leuk Est   Nitrite   Protein   CREAT   Urine HCG        12/17/17 1557 Dark Yellow(A) Turbid(A) Small (1+)(A) Large (3+)(A) Positive(A) 30 mg/dL (1+)(A)               Microbiology Results Abnormal     Procedure Component Value - Date/Time    Urine  Culture - Urine, Urine, Clean Catch [[id number]]  (Abnormal)  (Susceptibility) Collected:  12/17/17 1557    Lab Status:  Final result Specimen:  Urine from Urine, Clean Catch Updated:  12/19/17 1206     Urine Culture --      >100,000 CFU/mL Escherichia coli (A)    Susceptibility      Escherichia coli     SUZANNE     Amikacin <=16 ug/ml Susceptible     Ampicillin >16 ug/ml Resistant     Ampicillin + Sulbactam >16/8 ug/ml Resistant     Aztreonam <=8 ug/ml Susceptible     Cefepime <=8 ug/ml Susceptible     Cefotaxime <=2 ug/ml Susceptible     Ceftriaxone <=8 ug/ml Susceptible     Cefuroxime sodium <=4 ug/ml Susceptible     Cephalothin >16 ug/ml Resistant     Ciprofloxacin >2 ug/ml Resistant     Ertapenem <=1 ug/ml Susceptible     Gentamicin >8 ug/ml Resistant     Levofloxacin >4 ug/ml Resistant     Meropenem <=1 ug/ml Susceptible     Nitrofurantoin <=32 ug/ml Susceptible     Piperacillin + Tazobactam <=16 ug/ml Susceptible     Tetracycline >8 ug/ml Resistant     Tobramycin >8 ug/ml Resistant     Trimethoprim + Sulfamethoxazole >2/38 ug/ml Resistant                    Influenza A & B, RT PCR - Swab, Nasopharynx [[id number]]  (Abnormal) Collected:  12/17/17 2221    Lab Status:  Final result Specimen:  Swab from Nasopharynx Updated:  12/18/17 0800     Influenza A PCR Detected (C)     Influenza B PCR Not Detected    Influenza Antigen, Rapid - Swab, Nasopharynx [[id number]]  (Normal) Collected:  12/17/17 1558    Lab Status:  Final result Specimen:  Swab from Nasopharynx Updated:  12/17/17 1627     Influenza A Ag, EIA Negative     Influenza B Ag, EIA Negative          Imaging Results (all)     Procedure Component Value Units Date/Time    XR Chest 1 View [[id number]] Collected:  12/17/17 1650     Updated:  12/17/17 1708    Narrative:          EXAMINATION: XR CHEST, SINGLE VIEW - 12/17/2017     INDICATION: Shortness of air.     COMPARISON: 1/06/2016.     FINDINGS: There are postinflammatory changes in both upper lobes.  Cardiac  silhouette is normal. There are also bibasilar postinflammatory  changes. There is no acute inflammatory process, mass or effusion.           Impression:       Chronic postinflammatory changes with no acute findings.     DICTATED:     12/17/2017  EDITED:          12/17/2017           This report was finalized on 12/17/2017 5:06 PM by Dr. Nabil Lynn MD.             Results for orders placed during the hospital encounter of 12/17/17   Adult Transthoracic Echo Complete W/ Cont if Necessary Per Protocol    Narrative · Left ventricular systolic function is normal. Estimated EF = 65%.  · Mild to moderate tricuspid valve regurgitation is present.  · The left ventricular cavity is small.  · Left ventricular wall thickness is consistent with mild concentric   hypertrophy.  · Normal right ventricular cavity size, wall thickness, systolic function   and septal motion noted.  · There is no evidence of pericardial effusion.  · No evidence of pulmonary hypertension is present.  · The aortic valve exhibits sclerosis.  · Left atrial volume is borderline increased.            Discharge Details      Ute Enrique   Home Medication Instructions KAILEE:[id number]    Printed on:12/22/17 1146   Medication Information                      apixaban (ELIQUIS) 2.5 MG tablet tablet  Take 1 tablet by mouth Every 12 (Twelve) Hours.             arformoterol (BROVANA) 15 MCG/2ML nebulizer solution  Take 15 mcg by nebulization 2 (two) times a day.             aspirin 81 MG EC tablet  Take 81 mg by mouth daily.             bisoprolol (ZEBeta) 5 MG tablet  Take 1 tablet by mouth Daily.             budesonide (PULMICORT) 0.5 MG/2ML nebulizer solution  Take 0.5 mg by nebulization 2 (Two) Times a Day.             cholecalciferol (VITAMIN D3) 1000 units tablet  Take 1,000 Units by mouth Daily.             cortisone (CORTONE) 25 MG tablet  Take 25 mg by mouth 2 (Two) Times a Day.             glimepiride (AMARYL) 1 MG tablet  Take 1 mg by mouth Every  Morning Before Breakfast.             montelukast (SINGULAIR) 10 MG tablet  Take 10 mg by mouth Every Night.             pantoprazole (PROTONIX) 20 MG EC tablet  Take 40 mg by mouth Every Night.             rosuvastatin (CRESTOR) 5 MG tablet  Take 5 mg by mouth Daily.             sitaGLIPtin (JANUVIA) 100 MG tablet  Take 100 mg by mouth daily.             Solifenacin Succinate (VESICARE PO)  Take 5 mg by mouth Daily.             torsemide (DEMADEX) 5 MG tablet  Take 1 tablet by mouth Daily.                   Discharge Disposition:  Home or Self Care    Discharge Diet:  Diet Instructions     Advance Diet As Tolerated                     Discharge Activity:   Activity Instructions     Activity as Tolerated                     Special Instructions:  None  Future Appointments  Date Time Provider Department Center   1/5/2018 12:45 PM JHONNY Paula [facility]       Additional Instructions for the Follow-ups that You Need to Schedule     Ambulatory Referral to Home Health    As directed    Face to Face Visit Date:  12/21/2017    Follow-up Provider for Plan of Care?:  I treated the patient in an acute care facility and will not continue treatment after discharge.    Follow-up Provider:  JESSICA UNDERWOOD [[id number]]    Reason/Clinical Findings:  generalized weakness, s/p hospitalization    Describe mobility limitations that make leaving home difficult:  generalized weakness    Nursing/Therapeutic Services Requested:  Skilled Nursing Physical Therapy Occupational Therapy    Skilled nursing orders:  Cardiopulmonary assessments COPD management    PT orders:  Therapeutic exercise Gait Training Transfer training Home safety assessment Strengthening    Weight Bearing Status:  As Tolerated    Occupational orders:  Activities of daily living (Bath Aide please) Energy conservation Strengthening Cognition Fine motor Home safety assessment           Discharge Follow-up with PCP    As directed    Follow Up Details:  Jessica  Isai 2 weeks           Discharge Follow-up with Specified Provider: Selena 2 weeks with EKG in office    As directed    To:  Selena 2 weeks with EKG in office                     Time Spent on Discharge:  45 minutes    Titi Sigala MD  12/22/17  11:46 AM

## 2017-12-22 NOTE — PROGRESS NOTES
"Ute Enrique  [id number]  4/7/1926   LOS: 5 days   Patient Care Team:  Jessica Alex MD as PCP - General (Internal Medicine)    Chief Complaint:  SOB / WEAKNESS / ATRIAL FIBRILLATION    Subjective     Comfortable in bed on nasal option therapy with less tachypnea and dyspnea.  She complains of \"stuffy nose\".  She denies currently cough, sputum production, pleurisy, hemoptysis, nausea, emesis, headache, or focal motor-sensory changes.  Overall she states \"I'm getting better and would like to go home today if possible.\"  She states she will have access to continue his home health care at the time of discharge.    Objective     Vital Sign Min/Max for last 24 hours  Temp  Min: 96.5 °F (35.8 °C)  Max: 97.9 °F (36.6 °C)   BP  Min: 114/72  Max: 147/86   Pulse  Min: 72  Max: 109   Resp  Min: 16  Max: 20   SpO2  Min: 93 %  Max: 96 %   Flow (L/min)  Min: 2  Max: 3        Last 2 weights    12/17/17  1428 12/17/17  1955   Weight: 63.5 kg (140 lb) 68 kg (150 lb)         Intake/Output Summary (Last 24 hours) at 12/22/17 0719  Last data filed at 12/22/17 0238   Gross per 24 hour   Intake              840 ml   Output             2300 ml   Net            -1460 ml       Physical Exam:     General Appearance:    Alert, cooperative, in no acute distress   Lungs:     Few scattered expiratory wheezes and rhonchi without egophony or crackles,respirations regular, even and                   unlabored    Heart:    Irregular and normal rate, normal S1 and S2, grade 1/6            murmur, no gallop, no rub, no click   Abdomen:  Extremities:   Soft, non-tender, bowel sounds audible x4    No edema, normal range of motion   Pulses:   Pulses palpable and equal bilaterally      Results Review:     Results from last 7 days  Lab Units 12/21/17  0559 12/20/17  0639 12/19/17  0820   SODIUM mmol/L 138 138 136   POTASSIUM mmol/L 3.7 4.1 3.8   CHLORIDE mmol/L 98* 103 101   CO2 mmol/L 29.0 24.0 25.0   BUN mg/dL 40* 36* 35*   CREATININE mg/dL 1.10 " 1.00 1.20   GLUCOSE mg/dL 139* 180* 239*   CALCIUM mg/dL 9.1 8.5* 9.1       Results from last 7 days  Lab Units 12/21/17  0559 12/18/17  0527 12/17/17  1503   WBC 10*3/mm3 7.48 11.16* 15.75*   HEMOGLOBIN g/dL 10.5* 10.1* 11.9   HEMATOCRIT % 33.3* 33.5* 38.2   PLATELETS 10*3/mm3 252 165 188           Results from last 7 days  Lab Units 12/18/17  0527   HEMOGLOBIN A1C % 6.50*     · NO CXR.    · EKG: Atrial fibrillation with controlled ventricular response in the absence of acute ST-T changes, LVH, or scar.    Medication Review: REVIEWED; NO DRIPS.    Assessment/Plan     Admission for influenza and associated bronchitis and bronchospasm with respiratory failure with subsequent development of atrial fibrillation with anticoagulation and rate control.  We will attempt DC cardioversion this morning prior to discharge; procedure, risks, and potential complications discussed with patient and she is in agreement proceed.  She has developed an asymptomatic ecchymosis in the right precordial chest area and will need to be observed closely for bleeding diathesis.  Hopefully she can go home later today on the following discharge cardiac medications:    · Apixaban 2.5 mg BID  · Bisoprolol 5 mg daily  · Rosuvastatin 5 mg daily  · Torsemide 5 mg daily  · Follow-up [facility] in one-2 weeks with EKG  · Resume outpatient follow-up and monitoring with Dr. Jessica Alex and return for [facility] follow-up as required    Principal Problem:    Sepsis  Active Problems:    Vitamin D deficiency    Corticoadrenal insufficiency    COPD (chronic obstructive pulmonary disease)    UTI (urinary tract infection)    Malaise    JANETT (acute kidney injury)    Advanced age    Diabetes mellitus    Possible Influenza    Former smoker        12/22/17  7:19 AM

## 2017-12-22 NOTE — PROGRESS NOTES
Adult Nutrition  Assessment/PES    Patient Name:  Ute Enrique  YOB: 1926  MRN: [id number]  Admit Date:  12/17/2017    Assessment Date:  12/22/2017    Comments:            Reason for Assessment       12/22/17 0844    Reason for Assessment    Reason For Assessment/Visit length of stay    Time Spent (min) 5                              Problem/Interventions:        Problem 1       12/22/17 0844    Nutrition Diagnoses Problem 1    Problem 1 Nutrition Appropriate for Condition at this Time    Signs/Symptoms (evidenced by) PO Intake                          Education/Evaluation       12/22/17 0844    Monitor/Evaluation    Monitor Per protocol        Electronically signed by:  Karine Modi, MAKENZIE  12/22/17 8:44 AM

## 2017-12-22 NOTE — DISCHARGE PLACEMENT REQUEST
"Ute Enrique (89 y.o. Female)   Felipa Abraham, RN, BSN  [phone]  GOING HOME THIS AFTERNOON, DC SUMMARY ATTACHED.     Date of Birth Social Security Number Address Home Phone MRN    [id number]  [address] [facility] [address] [phone] [id number]    Catholic Marital Status          None        Admission Date Admission Type Admitting Provider Attending Provider Department, Room/Bed    17 Emergency Titi Sigala MD Russell, Marc P, MD Psychiatric 6A, N608/1    Discharge Date Discharge Disposition Discharge Destination         Home or Self Care             Attending Provider: Titi Sigala MD     Allergies:  Sulfa Antibiotics, Bactrim [Sulfamethoxazole-trimethoprim], Keflex [Cephalexin], Dilantin [Phenytoin Sodium Extended], Fludrocortisone Acetate [Fludrocortisone], Phenytoin    Isolation:  Droplet   Infection:  Influenza (17)   Code Status:  FULL    Ht:  154.9 cm (61\")   Wt:  68 kg (150 lb)    Admission Cmt:  None   Principal Problem:  Sepsis [A41.9]                 Active Insurance as of 2017     Primary Coverage     Payor Plan Insurance Group Employer/Plan Group    MEDICARE MEDICARE A & B      Payor Plan Address Payor Plan Phone Number Effective From Effective To    [address] [phone] 1991     [place] [address]       Subscriber Name Subscriber Birth Date Member ID       UTE ENRIQUE 1926 [id number]                 Emergency Contacts      (Rel.) Home Phone Work Phone Mobile Phone    Dhaval Landrum (Power of ) [phone] -- --    Steffanie Bass (Friend) [phone] -- --    Misty Palacios Md -- -- [phone]    Alessandro Raymundo Md -- -- [phone]               Discharge Summary      Titi Sigala MD at 2017 11:46 AM              [facility]  DISCHARGE SUMMARY    Patient Name: Ute Enrique  : 1926  MRN: [id number]    Date of Admission: 2017  Date of " Discharge:  12/21/2017  Primary Care Physician: Jessica Alex MD    Consults     Date and Time Order Name Status Description    12/19/2017 0818 Inpatient Consult to Cardiology Completed         Hospital Course     Presenting Problem:   Urinary tract infection without hematuria, site unspecified [N39.0]    Active Hospital Problems (** Indicates Principal Problem)    Diagnosis Date Noted   • **Sepsis [A41.9] 12/17/2017   • Atrial fibrillation [I48.91] 12/22/2017   • UTI (urinary tract infection) [N39.0] 12/17/2017   • Malaise [R53.81] 12/17/2017   • JANETT (acute kidney injury) [N17.9] 12/17/2017   • Advanced age [R54] 12/17/2017   • Diabetes mellitus [E11.9] 12/17/2017   • Possible Influenza [J11.1] 12/17/2017   • Former smoker [Z87.891] 12/17/2017   • COPD (chronic obstructive pulmonary disease) [J44.9]    • Corticoadrenal insufficiency [E27.40]    • Vitamin D deficiency [E55.9]       Resolved Hospital Problems    Diagnosis Date Noted Date Resolved   No resolved problems to display.          Hospital Course:  Ute Enrique is a 89 y.o. female with adrenal insufficiency on chronic steroids here with acute hypoxic respiratory failure secondary to influenza and an UTI/POA s/p antibiotic therapy. She is s/p treatment of influenza with supportive care and Tamiflu.  She had JANETT which resolved with IVF. She was ready for discharge, but acutely developed atrial fibrillation with RVR. She was placed on cardizem and eventually cardioverted successfully to NSR. Regardless, she is rate controlled and now on apixaban. She continues to have intermittent hypoxia, and will be placed on oxygen with close PCP follow up. She is ready for discharge now and comfortable.  She has HH and access to 24 hour caregivers at home.           Day of Discharge     HPI:   Continues with sinus/nasal congestion.     Review of Systems   Constitutional: Positive for activity change, appetite change and fatigue.   HENT: Positive for congestion,  rhinorrhea and sinus pressure.    Respiratory: Negative.    Cardiovascular: Positive for palpitations.   Gastrointestinal: Negative.    Genitourinary: Negative.    Neurological: Negative.          Otherwise ROS is negative except as mentioned in the HPI.    Vital Signs:   Temp:  [96.5 °F (35.8 °C)-97.8 °F (36.6 °C)] 97.8 °F (36.6 °C)  Heart Rate:  [] 74  Resp:  [16-20] 16  BP: ()/() 130/62     Physical Exam:  NAD, appears younger than stated age  OP clear, MMM  Neck supple  RRR this am  Rales at bases, non-labored  +BS, ND, NT  STYLES  No c/c/e  No rashes  Normal affect                   Ute Enrique   Cardioversion External in Cardiology Department   Order# [id number]    Reading physician: Tito Duffy III, MD   Ordering physician: Tito Duffy III, MD   Study date: 17         Patient Information      Patient Name MRN Sex  (Age)     Ute Enrique [id number] Female 1926 (89 y.o.)       Admission Information      Admission Date/Time Discharge Date/Time Room/Bed     17  1416  N608/1       Interpretation Summary      · Post cardioversion the patient displayed a sinus rhythm.  · The cardioversion was successful.               [facility] NONINVASIVE LAB  [address] [place], [place] [address] [phone]                 Ute Enrique   Echocardiogram   Order# [id number]    Reading physician: Erik Walters MD   Ordering physician: Titi Sigala MD   Study date: 17         Patient Information      Patient Name MRN Sex  (Age)     Ute Enrique [id number] Female 1926 (89 y.o.)       Admission Information      Admission Date/Time Discharge Date/Time Room/Bed     17  1416  N608/1       Sedation Narrator Report      Sedation Narrator Report       Interpretation Summary      · Left ventricular systolic function is normal. Estimated EF = 65%.  · Mild to moderate tricuspid valve regurgitation is present.  · The left ventricular cavity is  small.  · Left ventricular wall thickness is consistent with mild concentric hypertrophy.  · Normal right ventricular cavity size, wall thickness, systolic function and septal motion noted.  · There is no evidence of pericardial effusion.  · No evidence of pulmonary hypertension is present.  · The aortic valve exhibits sclerosis.  · Left atrial volume is borderline increased.       Pertinent  and/or Most Recent Results       Results from last 7 days  Lab Units 12/21/17  0559 12/20/17  0639 12/19/17  0820 12/18/17  0527 12/17/17  1503   WBC 10*3/mm3 7.48  --   --  11.16* 15.75*   HEMOGLOBIN g/dL 10.5*  --   --  10.1* 11.9   HEMATOCRIT % 33.3*  --   --  33.5* 38.2   PLATELETS 10*3/mm3 252  --   --  165 188   SODIUM mmol/L 138 138 136 134 133   POTASSIUM mmol/L 3.7 4.1 3.8 4.5 4.3   CHLORIDE mmol/L 98* 103 101 101 96*   CO2 mmol/L 29.0 24.0 25.0 22.0 26.0   BUN mg/dL 40* 36* 35* 28* 28*   CREATININE mg/dL 1.10 1.00 1.20 1.20 1.50*   GLUCOSE mg/dL 139* 180* 239* 299* 149*   CALCIUM mg/dL 9.1 8.5* 9.1 7.9* 9.4       Results from last 7 days  Lab Units 12/17/17  1503   BILIRUBIN mg/dL 1.1   ALK PHOS U/L 56   ALT (SGPT) U/L 20   AST (SGOT) U/L 25           Invalid input(s): TG, LDLREALC    Results from last 7 days  Lab Units 12/18/17  0527 12/17/17  1503   HEMOGLOBIN A1C % 6.50*  --    BNP pg/mL  --  318.0*     Brief Urine Lab Results  (Last result in the past 365 days)      Color   Clarity   Blood   Leuk Est   Nitrite   Protein   CREAT   Urine HCG        12/17/17 1557 Dark Yellow(A) Turbid(A) Small (1+)(A) Large (3+)(A) Positive(A) 30 mg/dL (1+)(A)               Microbiology Results Abnormal     Procedure Component Value - Date/Time    Urine Culture - Urine, Urine, Clean Catch [[id number]]  (Abnormal)  (Susceptibility) Collected:  12/17/17 9701    Lab Status:  Final result Specimen:  Urine from Urine, Clean Catch Updated:  12/19/17 1206     Urine Culture --      >100,000 CFU/mL Escherichia coli (A)    Susceptibility       Escherichia coli     SUZANNE     Amikacin <=16 ug/ml Susceptible     Ampicillin >16 ug/ml Resistant     Ampicillin + Sulbactam >16/8 ug/ml Resistant     Aztreonam <=8 ug/ml Susceptible     Cefepime <=8 ug/ml Susceptible     Cefotaxime <=2 ug/ml Susceptible     Ceftriaxone <=8 ug/ml Susceptible     Cefuroxime sodium <=4 ug/ml Susceptible     Cephalothin >16 ug/ml Resistant     Ciprofloxacin >2 ug/ml Resistant     Ertapenem <=1 ug/ml Susceptible     Gentamicin >8 ug/ml Resistant     Levofloxacin >4 ug/ml Resistant     Meropenem <=1 ug/ml Susceptible     Nitrofurantoin <=32 ug/ml Susceptible     Piperacillin + Tazobactam <=16 ug/ml Susceptible     Tetracycline >8 ug/ml Resistant     Tobramycin >8 ug/ml Resistant     Trimethoprim + Sulfamethoxazole >2/38 ug/ml Resistant                    Influenza A & B, RT PCR - Swab, Nasopharynx [[id number]]  (Abnormal) Collected:  12/17/17 2221    Lab Status:  Final result Specimen:  Swab from Nasopharynx Updated:  12/18/17 0800     Influenza A PCR Detected (C)     Influenza B PCR Not Detected    Influenza Antigen, Rapid - Swab, Nasopharynx [[id number]]  (Normal) Collected:  12/17/17 1558    Lab Status:  Final result Specimen:  Swab from Nasopharynx Updated:  12/17/17 1627     Influenza A Ag, EIA Negative     Influenza B Ag, EIA Negative          Imaging Results (all)     Procedure Component Value Units Date/Time    XR Chest 1 View [[id number]] Collected:  12/17/17 1650     Updated:  12/17/17 1708    Narrative:          EXAMINATION: XR CHEST, SINGLE VIEW - 12/17/2017     INDICATION: Shortness of air.     COMPARISON: 1/06/2016.     FINDINGS: There are postinflammatory changes in both upper lobes.  Cardiac silhouette is normal. There are also bibasilar postinflammatory  changes. There is no acute inflammatory process, mass or effusion.           Impression:       Chronic postinflammatory changes with no acute findings.     DICTATED:     12/17/2017  EDITED:          12/17/2017           This  report was finalized on 12/17/2017 5:06 PM by Dr. Nabil Lynn MD.             Results for orders placed during the hospital encounter of 12/17/17   Adult Transthoracic Echo Complete W/ Cont if Necessary Per Protocol    Narrative · Left ventricular systolic function is normal. Estimated EF = 65%.  · Mild to moderate tricuspid valve regurgitation is present.  · The left ventricular cavity is small.  · Left ventricular wall thickness is consistent with mild concentric   hypertrophy.  · Normal right ventricular cavity size, wall thickness, systolic function   and septal motion noted.  · There is no evidence of pericardial effusion.  · No evidence of pulmonary hypertension is present.  · The aortic valve exhibits sclerosis.  · Left atrial volume is borderline increased.            Discharge Details      Ute Enrique   Home Medication Instructions KAILEE:[id number]    Printed on:12/22/17 7846   Medication Information                      apixaban (ELIQUIS) 2.5 MG tablet tablet  Take 1 tablet by mouth Every 12 (Twelve) Hours.             arformoterol (BROVANA) 15 MCG/2ML nebulizer solution  Take 15 mcg by nebulization 2 (two) times a day.             aspirin 81 MG EC tablet  Take 81 mg by mouth daily.             bisoprolol (ZEBeta) 5 MG tablet  Take 1 tablet by mouth Daily.             budesonide (PULMICORT) 0.5 MG/2ML nebulizer solution  Take 0.5 mg by nebulization 2 (Two) Times a Day.             cholecalciferol (VITAMIN D3) 1000 units tablet  Take 1,000 Units by mouth Daily.             cortisone (CORTONE) 25 MG tablet  Take 25 mg by mouth 2 (Two) Times a Day.             glimepiride (AMARYL) 1 MG tablet  Take 1 mg by mouth Every Morning Before Breakfast.             montelukast (SINGULAIR) 10 MG tablet  Take 10 mg by mouth Every Night.             pantoprazole (PROTONIX) 20 MG EC tablet  Take 40 mg by mouth Every Night.             rosuvastatin (CRESTOR) 5 MG tablet  Take 5 mg by mouth Daily.              sitaGLIPtin (JANUVIA) 100 MG tablet  Take 100 mg by mouth daily.             Solifenacin Succinate (VESICARE PO)  Take 5 mg by mouth Daily.             torsemide (DEMADEX) 5 MG tablet  Take 1 tablet by mouth Daily.                   Discharge Disposition:  Home or Self Care    Discharge Diet:  Diet Instructions     Advance Diet As Tolerated                     Discharge Activity:   Activity Instructions     Activity as Tolerated                     Special Instructions:  None  Future Appointments  Date Time Provider Department Center   1/5/2018 12:45 PM JHONNY Paula [facility]       Additional Instructions for the Follow-ups that You Need to Schedule     Ambulatory Referral to Home Health    As directed    Face to Face Visit Date:  12/21/2017    Follow-up Provider for Plan of Care?:  I treated the patient in an acute care facility and will not continue treatment after discharge.    Follow-up Provider:  JESSICA ALEX [[id number]]    Reason/Clinical Findings:  generalized weakness, s/p hospitalization    Describe mobility limitations that make leaving home difficult:  generalized weakness    Nursing/Therapeutic Services Requested:  Skilled Nursing Physical Therapy Occupational Therapy    Skilled nursing orders:  Cardiopulmonary assessments COPD management    PT orders:  Therapeutic exercise Gait Training Transfer training Home safety assessment Strengthening    Weight Bearing Status:  As Tolerated    Occupational orders:  Activities of daily living (Bath Aide please) Energy conservation Strengthening Cognition Fine motor Home safety assessment           Discharge Follow-up with PCP    As directed    Follow Up Details:  Jessica Alex 2 weeks           Discharge Follow-up with Specified Provider: Selena 2 weeks with EKG in office    As directed    To:  Selena 2 weeks with EKG in office                     Time Spent on Discharge:  45 minutes    Titi Sigala MD  12/22/17  11:46 AM          Electronically signed by Titi Sigala MD at 12/22/2017 11:53 AM

## 2017-12-22 NOTE — PLAN OF CARE
Problem: Patient Care Overview (Adult)  Goal: Plan of Care Review  Outcome: Ongoing (interventions implemented as appropriate)   12/22/17 0508   Coping/Psychosocial Response Interventions   Plan Of Care Reviewed With patient   Patient Care Overview   Progress improving   Outcome Evaluation   Outcome Summary/Follow up Plan Pt's vitals are stable, remains in controlled Afib. Pt is NPO for potential Cardioversion today.Pt had no complaints or events through out the night.      Goal: Discharge Needs Assessment  Outcome: Ongoing (interventions implemented as appropriate)   12/18/17 1408 12/22/17 0508   Discharge Needs Assessment   Concerns To Be Addressed --  denies needs/concerns at this time   Discharge Disposition still a patient --        Problem: Sepsis (Adult)  Goal: Signs and Symptoms of Listed Potential Problems Will be Absent or Manageable (Sepsis)  Outcome: Ongoing (interventions implemented as appropriate)   12/22/17 0508   Sepsis   Problems Assessed (Sepsis) all   Problems Present (Sepsis) none       Problem: Fall Risk (Adult)  Goal: Identify Related Risk Factors and Signs and Symptoms  Outcome: Ongoing (interventions implemented as appropriate)   12/22/17 0508   Fall Risk   Fall Risk: Related Risk Factors age-related changes;gait/mobility problems;polypharmacy;sensory deficits   Fall Risk: Signs and Symptoms presence of risk factors     Goal: Absence of Falls  Outcome: Ongoing (interventions implemented as appropriate)   12/22/17 0508   Fall Risk (Adult)   Absence of Falls achieves outcome       Problem: Arrhythmia/Dysrhythmia (Symptomatic) (Adult)  Goal: Signs and Symptoms of Listed Potential Problems Will be Absent or Manageable (Arrhythmia/Dysrhythmia)  Outcome: Ongoing (interventions implemented as appropriate)   12/22/17 0508   Arrhythmia/Dysrhythmia (Symptomatic)   Problems Assessed (Arrhythmia/Dysrhythmia) all   Problems Present (Arrhythmia/Dysrhythmia) electrophysiological conduction defect

## 2018-01-03 ENCOUNTER — OUTSIDE FACILITY SERVICE (OUTPATIENT)
Dept: HOSPITALIST | Facility: HOSPITAL | Age: 83
End: 2018-01-03

## 2018-01-03 PROCEDURE — G0180 MD CERTIFICATION HHA PATIENT: HCPCS | Performed by: HOSPITALIST

## 2018-01-12 ENCOUNTER — HOSPITAL ENCOUNTER (OUTPATIENT)
Dept: CARDIOLOGY | Facility: HOSPITAL | Age: 83
Discharge: HOME | End: 2018-01-12

## 2018-01-12 ENCOUNTER — OFFICE VISIT (OUTPATIENT)
Dept: CARDIOLOGY | Facility: HOSPITAL | Age: 83
End: 2018-01-12

## 2018-01-12 VITALS
RESPIRATION RATE: 16 BRPM | OXYGEN SATURATION: 97 % | DIASTOLIC BLOOD PRESSURE: 72 MMHG | BODY MASS INDEX: 26.58 KG/M2 | WEIGHT: 140.8 LBS | HEIGHT: 61 IN | TEMPERATURE: 98.2 F | SYSTOLIC BLOOD PRESSURE: 151 MMHG | HEART RATE: 86 BPM

## 2018-01-12 DIAGNOSIS — I11.9 HYPERTENSIVE HEART DISEASE WITHOUT HEART FAILURE: ICD-10-CM

## 2018-01-12 DIAGNOSIS — I48.0 PAROXYSMAL ATRIAL FIBRILLATION (HCC): Primary | ICD-10-CM

## 2018-01-12 DIAGNOSIS — G47.33 OSA (OBSTRUCTIVE SLEEP APNEA): ICD-10-CM

## 2018-01-12 DIAGNOSIS — I48.0 PAROXYSMAL ATRIAL FIBRILLATION (HCC): ICD-10-CM

## 2018-01-12 PROCEDURE — 99213 OFFICE O/P EST LOW 20 MIN: CPT | Performed by: NURSE PRACTITIONER

## 2018-01-12 NOTE — PROGRESS NOTES
[facility]  Heart and Valve Center      Encounter Date:01/12/2018     Ute Glass [address] [facility] [address]  [phone]    4/7/1926    Jessica Alex MD    Ute Enrique is a 89 y.o. female.      Subjective:     Chief Complaint:  Atrial Fibrillation (newly diagnosed during hospitalization.)       HPI     Patient was admitted to [facility] 12/17/17.  She presented with TIA without hematuria.  Treated for sepsis, respiratory failure secondary to influenza.  89-year-old female with a history of adrenal insufficiency on chronic steroids.  Prior to discharge patient developed atrial fibrillation with RVR.  Patient underwent cardioversion 12/22/17.  Echocardiogram completed showing EF 65%, mild to moderate TR.  Started on Eliquis.  Continued on beta blocker.  Scheduled follow-up in the heart and valve Center for evaluation.    Pt denies cP, pressure, palpitations, dyspnea, edema, dizziness, syncope.  Home BP log today SbP 120's consistantly with HR 60-70's.  She has decreased Torsemide per PCP recommendations and is doing well.  Reports improved physical activity tolerance.    Patient Active Problem List    Diagnosis   • Atrial fibrillation [I48.91]   • Sepsis [A41.9]   • UTI (urinary tract infection) [N39.0]   • Malaise [R53.81]   • JANETT (acute kidney injury) [N17.9]   • Advanced age [R54]   • Diabetes mellitus [E11.9]     Overview Note:     Type 2     • Possible Influenza [J11.1]   • Former smoker [Z87.891]   • Hypertensive cardiovascular disease [I11.9]     Overview Note:     a. Remote history of dyspnea on exertion with reportedly normal echocardiogram and stress test - data deficit, 1990s.    b. Progressive dyspnea and fatigue with abnormal chest x-ray demonstrating lingular pneumonia with followup CT scan with contrast revealing evidence of previous diffuse granulomatous infection and lingering pneumonia having resolved, June 2013, with subsequent reported normal PQ  scan, June 2013.  c. Recurrent NYHA class III-IV dyspnea on exertion with EKG, July 2013 demonstrating leftward axis and possible old infarct with normal heart rate, PACs with acceptable echocardiogram echocardiographic GXT with excellent exercise tolerance and capacity (110% predicted) with normal oximetries and overall low probability for significant focal obstructive coronary disease with residual CCS class I chest pain syndrome NYHA class II exertional dyspnea and fatigue, August 2013.    d. Followup CT scan of the chest without contrast revealing evidence of previous diffuse granulomatous infection and noted lingular pneumonia, resolved since 06/15/2013 (06/26/2013).  e. Reportedly normal VQ scan ruling out pulmonary embolism - data deficit, 06/15/2013.  f. Recent NYHA class III-IV dyspnea on exertion with EKG demonstrating sinus rhythm with PACs, left axis deviation, and possible old infarct with ventricular rate of 75 BPM, 07/24/2013.  g. Residual CCS class I angina pectoris/NYHA class II exertional dyspnea and fatigue.       • Dyslipidemia [E78.5]   • ADALGISA (obstructive sleep apnea) [G47.33]     Overview Note:     with CPAP use     • Peripheral neuropathy [G62.9]   • Vitamin D deficiency [E55.9]   • Kidney disease [N28.9]     Overview Note:     chronic- stage 3     • Peripheral vascular disease [I73.9]     Overview Note:     h. Apparent probable arterial duplex demonstrating peripheral vascular disease with initiation of Plavix therapy 3-4 years ago.    i. No claudication symptoms.  No residual TIA or claudication symptoms.      • Osteoarthritis [M19.90]   • Osteopenia [M85.80]   • Corticoadrenal insufficiency [E27.40]     Overview Note:     on chronic steroids therapy     • COPD (chronic obstructive pulmonary disease) [J44.9]         Past Surgical History:   Procedure Laterality Date   • BREAST SURGERY Right 2007    Breast biopsy   • CATARACT EXTRACTION Bilateral        Allergies   Allergen Reactions   •  Sulfa Antibiotics Anaphylaxis   • Bactrim [Sulfamethoxazole-Trimethoprim] Hives   • Keflex [Cephalexin] GI Intolerance   • Dilantin [Phenytoin Sodium Extended] Rash   • Fludrocortisone Acetate [Fludrocortisone] Palpitations     Hypertension  Edema     • Phenytoin Rash         Current Outpatient Prescriptions:   •  apixaban (ELIQUIS) 2.5 MG tablet tablet, Take 1 tablet by mouth Every 12 (Twelve) Hours., Disp: 60 tablet, Rfl: 5  •  arformoterol (BROVANA) 15 MCG/2ML nebulizer solution, Take 15 mcg by nebulization 2 (two) times a day., Disp: , Rfl:   •  aspirin 81 MG EC tablet, Take 81 mg by mouth daily., Disp: , Rfl:   •  bisoprolol (ZEBeta) 5 MG tablet, Take 1 tablet by mouth Daily., Disp: 30 tablet, Rfl: 5  •  budesonide (PULMICORT) 0.5 MG/2ML nebulizer solution, Take 0.5 mg by nebulization 2 (Two) Times a Day., Disp: , Rfl:   •  cholecalciferol (VITAMIN D3) 1000 units tablet, Take 1,000 Units by mouth Daily., Disp: , Rfl:   •  cortisone (CORTONE) 25 MG tablet, Take 25 mg by mouth 2 (Two) Times a Day., Disp: , Rfl:   •  glimepiride (AMARYL) 1 MG tablet, Take 1 mg by mouth Every Morning Before Breakfast., Disp: , Rfl:   •  montelukast (SINGULAIR) 10 MG tablet, Take 10 mg by mouth Every Night., Disp: , Rfl:   •  pantoprazole (PROTONIX) 20 MG EC tablet, Take 40 mg by mouth Every Night., Disp: , Rfl:   •  rosuvastatin (CRESTOR) 5 MG tablet, Take 5 mg by mouth Daily., Disp: , Rfl:   •  sitaGLIPtin (JANUVIA) 100 MG tablet, Take 100 mg by mouth daily., Disp: , Rfl:   •  Solifenacin Succinate (VESICARE PO), Take 5 mg by mouth Daily., Disp: , Rfl:   •  torsemide (DEMADEX) 5 MG tablet, Take 1 tablet by mouth Daily., Disp: 30 tablet, Rfl: 1    The following portions of the patient's history were reviewed and updated as appropriate: allergies, current medications, past family history, past medical history, past social history, past surgical history and problem list.    Review of Systems   Constitution: Positive for weakness,  "malaise/fatigue and weight loss. Negative for chills, decreased appetite, diaphoresis, fever, night sweats and weight gain.   HENT: Positive for congestion. Negative for nosebleeds.    Eyes: Negative for blurred vision, visual disturbance and visual halos.   Cardiovascular: Negative for chest pain, claudication, cyanosis, dyspnea on exertion, irregular heartbeat, leg swelling, near-syncope, orthopnea, palpitations, paroxysmal nocturnal dyspnea and syncope.   Respiratory: Positive for cough, shortness of breath and sputum production. Negative for hemoptysis, sleep disturbances due to breathing, snoring and wheezing.    Endocrine: Positive for cold intolerance, heat intolerance and polydipsia. Negative for polyphagia and polyuria.   Hematologic/Lymphatic: Bruises/bleeds easily.   Skin: Negative for dry skin, itching and rash.   Musculoskeletal: Positive for muscle weakness. Negative for falls, joint pain, joint swelling and myalgias.   Gastrointestinal: Positive for constipation. Negative for bloating, abdominal pain, diarrhea, dysphagia, heartburn, melena, nausea and vomiting.   Genitourinary: Negative for dysuria, flank pain, hematuria and nocturia.   Neurological: Positive for light-headedness. Negative for difficulty with concentration, excessive daytime sleepiness, dizziness, headaches and loss of balance.   Psychiatric/Behavioral: Negative for altered mental status and depression. The patient is not nervous/anxious.    Allergic/Immunologic: Negative for environmental allergies.       Objective:     Vitals:    01/12/18 1241 01/12/18 1244 01/12/18 1247   BP: 172/75 157/71 151/72   BP Location: Right arm Left arm Left arm   Patient Position: Sitting Sitting Standing   Pulse: 82 81 86   Resp: 16     Temp: 98.2 °F (36.8 °C)     TempSrc: Temporal Artery      SpO2: 95% 97%    Weight: 63.9 kg (140 lb 12.8 oz)     Height: 154.9 cm (61\")           Physical Exam   Constitutional: She is oriented to person, place, and time. " She appears well-developed and well-nourished. No distress.   HENT:   Head: Normocephalic and atraumatic.   Mouth/Throat: Oropharynx is clear and moist.   Eyes: Conjunctivae are normal. Pupils are equal, round, and reactive to light. No scleral icterus.   Neck: No hepatojugular reflux and no JVD present. Carotid bruit is not present. No tracheal deviation present. No thyromegaly present.   Cardiovascular: Normal rate, regular rhythm, normal heart sounds and intact distal pulses.  Exam reveals no friction rub.    No murmur heard.  Pulmonary/Chest: Effort normal and breath sounds normal.   Abdominal: Soft. Bowel sounds are normal. She exhibits no distension. There is no tenderness.   Musculoskeletal: She exhibits no edema.   Lymphadenopathy:     She has no cervical adenopathy.   Neurological: She is alert and oriented to person, place, and time.   Skin: Skin is warm, dry and intact. No rash noted. No cyanosis or erythema. No pallor.   Psychiatric: She has a normal mood and affect. Her behavior is normal. Thought content normal.   Vitals reviewed.      Lab and Diagnostic Review:    Echo:  · Left ventricular systolic function is normal. Estimated EF = 65%.  · Mild to moderate tricuspid valve regurgitation is present.  · The left ventricular cavity is small.  · Left ventricular wall thickness is consistent with mild concentric hypertrophy.  · Normal right ventricular cavity size, wall thickness, systolic function and septal motion noted.  · There is no evidence of pericardial effusion.  · No evidence of pulmonary hypertension is present.  · The aortic valve exhibits sclerosis.  · Left atrial volume is borderline increased.    Lab Results   Component Value Date    WBC 7.48 12/21/2017    HGB 10.5 (L) 12/21/2017    HCT 33.3 (L) 12/21/2017    MCV 81.2 12/21/2017     12/21/2017     Lab Results   Component Value Date    GLUCOSE 139 (H) 12/21/2017    BUN 40 (H) 12/21/2017    CREATININE 1.10 12/21/2017    EGFRIFNONA 47  (L) 12/21/2017    BCR 36.4 (H) 12/21/2017    K 3.7 12/21/2017    CO2 29.0 12/21/2017    CALCIUM 9.1 12/21/2017    ALBUMIN 4.10 12/17/2017    LABIL2 1.6 12/17/2017    AST 25 12/17/2017    ALT 20 12/17/2017     Magnesium 1.3 - 2.7 mg/dL 1.8         Assessment and Plan:         1. Paroxysmal atrial fibrillation  Pt declined EKG today due to sensitive skin  HR regular, rate controlled, and asymptomatic    Continue Eliquis and BB    SANDER fib education completed: What is atrial fibrillation, causes, triggers, signs and symptoms, medication management (rate control versus rhythm control) and stroke prevention, procedural management and indications, and the role of the atrial fibrillation center and when to call.    2. Hypertensive heart disease without heart failure  Elevated in clinic, but home BP log 120s.    Continue to monitor      3. ADALGISA (obstructive sleep apnea)  Compliant    F/u 3 months or sooner.        *Please note that portions of this note were completed with a voice recognition program. Efforts were made to edit the dictations, but occasionally words are mistranscribed.

## 2018-02-20 ENCOUNTER — OFFICE VISIT (OUTPATIENT)
Dept: CARDIOLOGY | Facility: CLINIC | Age: 83
End: 2018-02-20

## 2018-02-20 VITALS
HEIGHT: 60 IN | SYSTOLIC BLOOD PRESSURE: 142 MMHG | BODY MASS INDEX: 27.88 KG/M2 | HEART RATE: 83 BPM | DIASTOLIC BLOOD PRESSURE: 67 MMHG | WEIGHT: 142 LBS

## 2018-02-20 DIAGNOSIS — I11.9 HYPERTENSIVE HEART DISEASE WITHOUT HEART FAILURE: ICD-10-CM

## 2018-02-20 DIAGNOSIS — I48.0 PAROXYSMAL ATRIAL FIBRILLATION (HCC): Primary | ICD-10-CM

## 2018-02-20 DIAGNOSIS — E78.5 DYSLIPIDEMIA: ICD-10-CM

## 2018-02-20 PROCEDURE — 99214 OFFICE O/P EST MOD 30 MIN: CPT | Performed by: INTERNAL MEDICINE

## 2018-02-20 RX ORDER — FAMOTIDINE 20 MG/1
20 TABLET, FILM COATED ORAL DAILY
COMMUNITY

## 2018-02-20 RX ORDER — GUAIFENESIN 600 MG/1
1200 TABLET, EXTENDED RELEASE ORAL 2 TIMES DAILY
COMMUNITY
End: 2018-06-21

## 2018-02-20 NOTE — PROGRESS NOTES
Subjective:     Encounter Date:02/20/2018    Patient ID: Ute Enrique is a 89 y.o. , white female, part-time professor of Behavioral Science at , from [place], [place].        REFERRING INTERNIST:  Jessica Alex MD  PULMONOLOGIST:  Tito Fontenot MD, [facility]  NEPHROLOGIST:  [facility]     Chief Complaint:   Chief Complaint   Patient presents with   • Atrial Fibrillation   • Dizziness     Problem List:  1. Probable hypertensive cardiovascular disease:  a.  Remote history of dyspnea on exertion with reportedly normal echocardiogram and stress test - data deficit, 1990s.    b. Progressive dyspnea and fatigue with abnormal chest x-ray demonstrating lingular pneumonia with followup CT scan with contrast  revealing evidence of previous diffuse granulomatous infection and lingering pneumonia having resolved, June 2013, with subsequent reported normal PQ scan, June 2013.  c. Recurrent NYHA class III-IV dyspnea on exertion with EKG, July 2013 demonstrating  leftward axis and possible old infarct with normal heart rate, PACs with acceptable echocardiogram echocardiographic GXT with excellent exercise tolerance and capacity (110% predicted) with normal oximetries and overall low probability for significant  focal obstructive coronary disease with residual CCS class I chest pain syndrome NYHA class II exertional dyspnea and fatigue, August 2013.    d. Followup CT scan of the chest without contrast revealing evidence of previous diffuse granulomatous infection  and noted lingular pneumonia, resolved since 06/15/2013 (06/26/2013).  e. Reportedly normal VQ scan ruling out pulmonary embolism - data deficit, 06/15/2013.  f. Remote NYHA class III-IV dyspnea on exertion with EKG demonstrating sinus rhythm  with PACs, left axis deviation, and possible old infarct with ventricular rate of 75 BPM, 07/24/2013.  g. Residual CCS class I angina pectoris/NYHA class II exertional dyspnea and fatigue.     2. Remote apparent atypical pneumonia, spring 2013:  a.  Diagnosed approximately 6 weeks ago with increasing shortness of breath, pleuritic chest pain, weakness and fatigue.   b. Two rounds of antibiotics with slow improvement, July 2013.  3. Remote tobacco abuse, resolved 1975.  4. Dyslipidemia.   5. ADALGISA with CPAP use.   6. Peripheral vascular disease:  a. Apparent probable arterial duplex demonstrating peripheral vascular disease with initiation of Plavix therapy 3-4 years ago.    b. No claudication symptoms.   No residual TIA or claudication symptoms.   7. Peripheral neuropathy.   8.  Vitamin D deficiency.   9. Stage 3 chronic kidney disease.    10. Osteoarthritis.   11. Osteopenia.   12. Hyperuricemia with intermittent gout.  13. GERD.   14. Corticoadrenal insufficiency, on chronic prednisone therapy.   15. History of seizures.   16. History  of vasovagal syncope with positive tilt table test, data deficit.   17. Type 2 pre-diabetes mellitus.    18. Possible chronic obstructive pulmonary disease.   19. [facility] hospitalization December 2017 for influenza, urinary tract infection, with new onset atrial fibrillation with RVR  20. Paroxysmal atrial fibrillation  a. Anticoagulated with Eliquis, CHADsVasc 5  b. Echocardiogram 12/19/17: LVEF 0.65, mild to moderate TR, LV cavity is small, LV wall thickness consistent with mild concentric hypertrophy, normal RV cavity size, wall thickness, systolic motion and septal motion noted.  No pericardial effusion.  No pulmonary hypertension.  Aortic valve exhibits sclerosis, LA volume borderline increased  c. External cardioversion 12/22/17, successful with a 100 J biphasic shock  21. [facility] hospitalization x48 hours, January 22-24, 2018, for severe dehydration, followed by a syncopal episode, cultures negative for C. difficile  22. Remote operations:  a. Benign right breast biopsy, 2007.  b. Bilateral cataract removal.      Allergies   Allergen Reactions   • Sulfa  Antibiotics Anaphylaxis   • Bactrim [Sulfamethoxazole-Trimethoprim] Hives   • Keflex [Cephalexin] GI Intolerance   • Dilantin [Phenytoin Sodium Extended] Rash   • Fludrocortisone Acetate [Fludrocortisone] Palpitations     Hypertension  Edema     • Phenytoin Rash         Current Outpatient Prescriptions:   •  apixaban (ELIQUIS) 2.5 MG tablet tablet, Take 1 tablet by mouth Every 12 (Twelve) Hours., Disp: 60 tablet, Rfl: 5  •  arformoterol (BROVANA) 15 MCG/2ML nebulizer solution, Take 15 mcg by nebulization 2 (two) times a day., Disp: , Rfl:   •  bisoprolol (ZEBeta) 5 MG tablet, Take 1 tablet by mouth Daily., Disp: 30 tablet, Rfl: 5  •  budesonide (PULMICORT) 0.5 MG/2ML nebulizer solution, Take 0.5 mg by nebulization 2 (Two) Times a Day., Disp: , Rfl:   •  cholecalciferol (VITAMIN D3) 1000 units tablet, Take 1,000 Units by mouth Daily., Disp: , Rfl:   •  cortisone (CORTONE) 25 MG tablet, Take 25 mg by mouth 2 (Two) Times a Day., Disp: , Rfl:   •  famotidine (PEPCID) 20 MG tablet, Take 20 mg by mouth 2 (Two) Times a Day., Disp: , Rfl:   •  glimepiride (AMARYL) 1 MG tablet, Take 1 mg by mouth Every Morning Before Breakfast., Disp: , Rfl:   •  guaiFENesin (MUCINEX) 600 MG 12 hr tablet, Take 1,200 mg by mouth 2 (Two) Times a Day., Disp: , Rfl:   •  montelukast (SINGULAIR) 10 MG tablet, Take 10 mg by mouth Every Night., Disp: , Rfl:   •  POLYETHYLENE GLYCOL 3350 PO, Take  by mouth., Disp: , Rfl:   •  rosuvastatin (CRESTOR) 5 MG tablet, Take 5 mg by mouth Daily., Disp: , Rfl:   •  sitaGLIPtin (JANUVIA) 100 MG tablet, Take 100 mg by mouth daily., Disp: , Rfl:   •  Solifenacin Succinate (VESICARE PO), Take 5 mg by mouth Daily., Disp: , Rfl:   •  torsemide (DEMADEX) 5 MG tablet, Take 1 tablet by mouth Daily., Disp: 30 tablet, Rfl: 1    HISTORY OF PRESENT ILLNESS: Patient returns for followup after a 23-1/2 month hiatus.  In December 2017, she had a 5-day [facility] hospitalization for a UTI without hematuria with sepsis with discharge  "to home.  During this hospitalization she developed new onset paroxysmal atrial fibrillation and had a cardioversion which was successful, and she was started on Eliquis. After that, she had another hospitalization in January 2018 at the [facility] where she was having severe diarrhea and due to dehydration she had a syncopal episode.  Her cultures were negative for C. difficile.  She has been trying to gain her strength back since that episode.  Occasionally she will feel palpitations when she is lying down at night.  She denies any chest pressure.  She has been trying to regulate her torsemide with her physician.  She is taking it on average every other day.  She finds that if she takes it every day that her blood pressure gets too low and she has dizziness.  Her blood pressure log which she brought with her today showed systolic blood pressures around 110-115mmHg.  Patient otherwise denies chest pain, shortness of breath, PND, edema, palpitations, syncope or presyncope at this time.        Review of Systems   Constitution: Positive for weakness and malaise/fatigue.   Cardiovascular: Positive for dyspnea on exertion and leg swelling.   Respiratory: Positive for shortness of breath.    Hematologic/Lymphatic: Bruises/bleeds easily.   Skin: Positive for dry skin.   Gastrointestinal: Positive for constipation.   Neurological: Positive for dizziness and loss of balance.      Obtained and otherwise negative except as outlined in problem list and HPI.    Procedures       Objective:       Vitals:    02/20/18 1500 02/20/18 1506   BP: 156/73 142/67   BP Location: Right arm Right arm   Patient Position: Sitting Standing   Pulse: 81 83   Weight: 64.4 kg (142 lb)    Height: 152.4 cm (60\")      Body mass index is 27.73 kg/(m^2).   Last weight:  152 lbs (March 2016)    Physical Exam   Constitutional: She is oriented to person, place, and time. She appears well-developed and well-nourished.   Neck: No JVD present. " Carotid bruit is not present. No thyromegaly present.   Cardiovascular: S1 normal and S2 normal.  An irregular rhythm present. Exam reveals no gallop, no S3 and no friction rub.    Murmur heard.   Medium-pitched harsh early systolic murmur is present with a grade of 1/6  at the upper right sternal border  Pulses:       Carotid pulses are 1+ on the right side, and 1+ on the left side.       Radial pulses are 1+ on the right side, and 1+ on the left side.        Femoral pulses are 1+ on the right side, and 1+ on the left side.       Popliteal pulses are 1+ on the right side, and 1+ on the left side.        Dorsalis pedis pulses are 1+ on the right side, and 1+ on the left side.        Posterior tibial pulses are 1+ on the right side, and 1+ on the left side.   Pulmonary/Chest: Effort normal and breath sounds normal. She has no wheezes. She has no rhonchi. She has no rales.   Abdominal: Soft. She exhibits no mass. There is no hepatosplenomegaly. There is no tenderness. There is no guarding.   Bowel sounds audible x4   Musculoskeletal: Normal range of motion. She exhibits no edema.   Lymphadenopathy:     She has no cervical adenopathy.   Neurological: She is alert and oriented to person, place, and time.   Skin: Skin is warm, dry and intact. No rash noted.   Vitals reviewed.        Lab Review:   Lab Results   Component Value Date    GLUCOSE 139 (H) 12/21/2017    BUN 40 (H) 12/21/2017    CREATININE 1.10 12/21/2017    EGFRIFNONA 47 (L) 12/21/2017    BCR 36.4 (H) 12/21/2017    CO2 29.0 12/21/2017    CALCIUM 9.1 12/21/2017    ALBUMIN 4.10 12/17/2017    LABIL2 1.6 12/17/2017    AST 25 12/17/2017    ALT 20 12/17/2017       Lab Results   Component Value Date    WBC 7.48 12/21/2017    HGB 10.5 (L) 12/21/2017    HCT 33.3 (L) 12/21/2017    MCV 81.2 12/21/2017     12/21/2017       Lab Results   Component Value Date    HGBA1C 6.50 (H) 12/18/2017       Lab Results   Component Value Date    TSH 1.432 01/06/2016         Lab  Results   Component Value Date    .0 (H) 12/17/2017         Assessment:   Overall continued acceptable course with no interim cardiopulmonary complaints with acceptable functional status. We will defer additional diagnostic or therapeutic intervention from a cardiac perspective at this time.  She was recently diagnosed with paroxysmal atrial fibrillation, and is anticoagulated with Eliquis.  She is working with her physician monitoring her blood pressures.       Diagnosis Plan   1. Paroxysmal atrial fibrillation  Stable    2. Hypertensive heart disease without heart failure  Stable    3. Dyslipidemia  No new labs           Plan:         1. Patient to continue current medications and close follow up with the above providers.  2. Tentative cardiology follow up in August 2018, or patient may return sooner PRN.    Scribed for Erik Walters MD by Analilia Sawyer, APRN. 2/20/2018  3:52 PM    I, Erik Walters MD, [facility], personally performed the services described in this documentation as scribed by the above named individual in my presence, and it is both accurate and complete. At 4:20 PM on 02/20/2018

## 2018-03-22 ENCOUNTER — HOSPITAL ENCOUNTER (OUTPATIENT)
Dept: GENERAL RADIOLOGY | Facility: HOSPITAL | Age: 83
Discharge: HOME | End: 2018-03-22
Admitting: PHYSICIAN ASSISTANT

## 2018-03-22 ENCOUNTER — TRANSCRIBE ORDERS (OUTPATIENT)
Dept: ADMINISTRATIVE | Facility: HOSPITAL | Age: 83
End: 2018-03-22

## 2018-03-22 DIAGNOSIS — R06.02 SHORTNESS OF BREATH: Primary | ICD-10-CM

## 2018-03-22 PROCEDURE — 71046 X-RAY EXAM CHEST 2 VIEWS: CPT

## 2018-03-29 ENCOUNTER — HOSPITAL ENCOUNTER (OUTPATIENT)
Dept: GENERAL RADIOLOGY | Facility: HOSPITAL | Age: 83
Discharge: HOME | End: 2018-03-29
Attending: INTERNAL MEDICINE | Admitting: INTERNAL MEDICINE

## 2018-03-29 ENCOUNTER — TRANSCRIBE ORDERS (OUTPATIENT)
Dept: ADMINISTRATIVE | Facility: HOSPITAL | Age: 83
End: 2018-03-29

## 2018-03-29 DIAGNOSIS — J44.9 COPD WITH ASTHMA (HCC): Primary | ICD-10-CM

## 2018-03-29 PROCEDURE — 71046 X-RAY EXAM CHEST 2 VIEWS: CPT

## 2018-06-21 ENCOUNTER — HOSPITAL ENCOUNTER (OUTPATIENT)
Dept: CARDIOLOGY | Facility: HOSPITAL | Age: 83
Discharge: HOME | End: 2018-06-21
Attending: NURSE PRACTITIONER | Admitting: NURSE PRACTITIONER

## 2018-06-21 ENCOUNTER — OFFICE VISIT (OUTPATIENT)
Dept: CARDIOLOGY | Facility: HOSPITAL | Age: 83
End: 2018-06-21

## 2018-06-21 VITALS
WEIGHT: 126.4 LBS | HEART RATE: 101 BPM | DIASTOLIC BLOOD PRESSURE: 58 MMHG | TEMPERATURE: 97.7 F | OXYGEN SATURATION: 98 % | RESPIRATION RATE: 16 BRPM | HEIGHT: 60 IN | BODY MASS INDEX: 24.81 KG/M2 | SYSTOLIC BLOOD PRESSURE: 99 MMHG

## 2018-06-21 DIAGNOSIS — R00.0 TACHYCARDIA: ICD-10-CM

## 2018-06-21 DIAGNOSIS — R00.0 TACHYCARDIA: Primary | ICD-10-CM

## 2018-06-21 DIAGNOSIS — I10 ESSENTIAL HYPERTENSION: ICD-10-CM

## 2018-06-21 DIAGNOSIS — I48.0 PAROXYSMAL ATRIAL FIBRILLATION (HCC): Primary | ICD-10-CM

## 2018-06-21 DIAGNOSIS — R79.89 ELEVATED BRAIN NATRIURETIC PEPTIDE (BNP) LEVEL: ICD-10-CM

## 2018-06-21 DIAGNOSIS — G47.33 OSA (OBSTRUCTIVE SLEEP APNEA): ICD-10-CM

## 2018-06-21 PROCEDURE — 99214 OFFICE O/P EST MOD 30 MIN: CPT | Performed by: NURSE PRACTITIONER

## 2018-06-21 PROCEDURE — 93005 ELECTROCARDIOGRAM TRACING: CPT | Performed by: NURSE PRACTITIONER

## 2018-06-21 PROCEDURE — 93010 ELECTROCARDIOGRAM REPORT: CPT | Performed by: INTERNAL MEDICINE

## 2018-06-21 RX ORDER — HYDROCORTISONE 10 MG/1
50 TABLET ORAL DAILY
COMMUNITY

## 2018-06-21 RX ORDER — CARVEDILOL 12.5 MG/1
12.5 TABLET ORAL 2 TIMES DAILY WITH MEALS
COMMUNITY
End: 2018-06-21

## 2018-06-21 RX ORDER — BUSPIRONE HYDROCHLORIDE 10 MG/1
10 TABLET ORAL 2 TIMES DAILY
COMMUNITY

## 2018-06-21 RX ORDER — LUBIPROSTONE 8 UG/1
8 CAPSULE ORAL 2 TIMES DAILY PRN
COMMUNITY
End: 2018-06-21

## 2018-06-21 RX ORDER — MULTIPLE VITAMINS W/ MINERALS TAB 9MG-400MCG
1 TAB ORAL DAILY
COMMUNITY

## 2018-06-21 NOTE — PROGRESS NOTES
"[facility]  [facility] Val[facility]      Encounter Date:06/21/2018     Ute Enrique  [address] [facility] [address]  [phone]    4/7/1926    Jessica Alex MD    Ute Enrique is a 89 y.o. female.      Subjective:     Chief Complaint:   Increased HR    HPI   Ms. Enrique is a 89 YO F with history of adrenal insufficiency on chronic steroids, paroxysmal afib, HTN, ADALGISA, PVD, CKD hyperlipidemia, DM, vasovagal syncope and COPD who presents to the [facility] for evaluation of increased heart rate. Patient reports chronic episodes of afib since hospitalization in December. Recently HRs have been 80-120s. Her carvedilol was decreased at one point due to low blood pressures but recently back to 12.5mg BID. She feels like she is \"working to hard\" and feels worsening SOB and fatigue. Mild orthostatic dizziness. Recent fall in May and was hospitalized at [facility]. Reviewed vital signs reviewed from patient's residence [facility] [facility] and heart rates around  and a systolic blood pressures around 118-160.  Weight has been stable around 126-128.     Patient Active Problem List   Diagnosis   • Hypertensive cardiovascular disease   • Dyslipidemia   • ADALGISA (obstructive sleep apnea)   • Peripheral neuropathy   • Vitamin D deficiency   • Kidney disease   • Peripheral vascular disease   • Osteoarthritis   • Osteopenia   • Corticoadrenal insufficiency   • COPD (chronic obstructive pulmonary disease)   • Sepsis   • UTI (urinary tract infection)   • Malaise   • JANETT (acute kidney injury)   • Advanced age   • Diabetes mellitus   • Possible Influenza   • Former smoker   • Atrial fibrillation       Past Medical History:   Diagnosis Date   • Adrenal insufficiency    • Atypical pneumonia     Spring 2013-Diagnosed approximately 6 weeks ago with increasing shortness of breath, pleuritic chest pain, weakness and fatigue b.Two rounds of antibiotics with slow improvement, July 2013.    • COPD (chronic " obstructive pulmonary disease)    • Corticoadrenal insufficiency     on chronic prednisone therapy   • Diabetes mellitus     Type 2   • Dyslipidemia    • GERD (gastroesophageal reflux disease)    • Hypertensive cardiovascular disease    • Hyperuricemia      with intermittent gout   • Kidney disease     chronic- stage 3   • ADALGISA (obstructive sleep apnea)     with CPAP use   • Osteoarthritis    • Osteopenia    • Peripheral neuropathy    • Peripheral vascular disease    • Seizures    • Vasovagal syncope     with positive tilt table test, data deficit   • Vitamin D deficiency        Past Surgical History:   Procedure Laterality Date   • BREAST SURGERY Right 2007    Breast biopsy   • CATARACT EXTRACTION Bilateral        Family History   Problem Relation Age of Onset   • Heart disease Father    • Hypertension Father    • Heart failure Mother    • Cancer Sister         colon cancer   • Heart attack Brother    • Cancer Sister         lung cancer   • Stroke Sister        Social History     Social History   • Marital status:      Spouse name: N/A   • Number of children: N/A   • Years of education: N/A     Occupational History   • Not on file.     Social History Main Topics   • Smoking status: Former Smoker     Packs/day: 0.50     Years: 20.00     Types: Cigarettes     Quit date: 1975   • Smokeless tobacco: Never Used      Comment: quit in 1975   • Alcohol use Yes      Comment: socially    • Drug use: No   • Sexual activity: Defer     Other Topics Concern   • Not on file     Social History Narrative    Caffeine use: 1-2 cups coffee daily.    Patient lives at home alone.            Allergies   Allergen Reactions   • Sulfa Antibiotics Anaphylaxis   • Bactrim [Sulfamethoxazole-Trimethoprim] Hives   • Keflex [Cephalexin] GI Intolerance   • Dilantin [Phenytoin Sodium Extended] Rash   • Fludrocortisone Acetate [Fludrocortisone] Palpitations     Hypertension  Edema     • Phenytoin Rash         Current Outpatient Prescriptions:    •  apixaban (ELIQUIS) 2.5 MG tablet tablet, Take 1 tablet by mouth Every 12 (Twelve) Hours., Disp: 60 tablet, Rfl: 5  •  busPIRone (BUSPAR) 10 MG tablet, Take 10 mg by mouth 2 (Two) Times a Day., Disp: , Rfl:   •  carvedilol (COREG) 12.5 MG tablet, Take 12.5 mg by mouth 2 (Two) Times a Day With Meals., Disp: , Rfl:   •  cholecalciferol (VITAMIN D3) 1000 units tablet, Take 1,000 Units by mouth Daily., Disp: , Rfl:   •  famotidine (PEPCID) 20 MG tablet, Take 20 mg by mouth Daily., Disp: , Rfl:   •  hydrocortisone (CORTEF) 10 MG tablet, Take 50 mg by mouth Daily., Disp: , Rfl:   •  mometasone-formoterol (DULERA 100) 100-5 MCG/ACT inhaler, Inhale 2 puffs 2 (Two) Times a Day., Disp: , Rfl:   •  montelukast (SINGULAIR) 10 MG tablet, Take 10 mg by mouth Every Night., Disp: , Rfl:   •  Multiple Vitamins-Minerals (MULTIVITAMIN WITH MINERALS) tablet tablet, Take 1 tablet by mouth Daily., Disp: , Rfl:   •  POLYETHYLENE GLYCOL 3350 PO, Take 17 g by mouth Every Night., Disp: , Rfl:   •  rosuvastatin (CRESTOR) 5 MG tablet, Take 5 mg by mouth Daily., Disp: , Rfl:   •  sitaGLIPtin (JANUVIA) 100 MG tablet, Take 100 mg by mouth daily., Disp: , Rfl:     The following portions of the patient's history were reviewed and updated as appropriate: allergies, current medications, past family history, past medical history, past social history, past surgical history and problem list.    Review of Systems   Constitution: Positive for weakness, malaise/fatigue and weight loss. Negative for chills, decreased appetite, diaphoresis, fever, night sweats and weight gain.   HENT: Positive for congestion. Negative for nosebleeds.    Eyes: Negative for blurred vision, visual disturbance and visual halos.   Cardiovascular: Positive for dyspnea on exertion, irregular heartbeat and palpitations. Negative for chest pain, claudication, cyanosis, leg swelling, near-syncope, orthopnea, paroxysmal nocturnal dyspnea and syncope.   Respiratory: Positive for  "cough and shortness of breath. Negative for hemoptysis, sleep disturbances due to breathing, snoring and wheezing.    Endocrine: Negative for polyphagia and polyuria.   Hematologic/Lymphatic: Bruises/bleeds easily.   Skin: Negative for dry skin, itching and rash.   Musculoskeletal: Positive for falls and muscle weakness. Negative for joint pain, joint swelling and myalgias.   Gastrointestinal: Positive for constipation. Negative for bloating, abdominal pain, diarrhea, dysphagia, heartburn, melena, nausea and vomiting.   Genitourinary: Negative for dysuria, flank pain, hematuria and nocturia.   Neurological: Positive for light-headedness. Negative for difficulty with concentration, excessive daytime sleepiness, dizziness, headaches and loss of balance.   Psychiatric/Behavioral: Negative for altered mental status and depression. The patient is nervous/anxious.    Allergic/Immunologic: Negative for environmental allergies.       Objective:     Vitals:    06/21/18 1122 06/21/18 1127 06/21/18 1128   BP: 115/60 111/65 99/58   BP Location: Right arm Left arm Left arm   Patient Position: Sitting Sitting Standing   Pulse: 103 88 101   Resp: 16     Temp: 97.7 °F (36.5 °C)     TempSrc: Temporal Artery      SpO2: 98%     Weight: 57.3 kg (126 lb 6.4 oz)     Height: 152.4 cm (60\")         Physical Exam   Constitutional: She is oriented to person, place, and time. She appears well-developed and well-nourished. No distress.   HENT:   Head: Normocephalic and atraumatic.   Mouth/Throat: Oropharynx is clear and moist.   Eyes: Conjunctivae are normal. Pupils are equal, round, and reactive to light. No scleral icterus.   Neck: No hepatojugular reflux and no JVD present. Carotid bruit is not present. No tracheal deviation present. No thyromegaly present.   Cardiovascular: Normal heart sounds and intact distal pulses.  An irregularly irregular rhythm present. Tachycardia present.  Exam reveals no friction rub.    No murmur " heard.  Pulmonary/Chest: Effort normal and breath sounds normal.   Abdominal: Soft. Bowel sounds are normal. She exhibits no distension. There is no tenderness.   Musculoskeletal: She exhibits edema (2+ of bilateral ankles).   Lymphadenopathy:     She has no cervical adenopathy.   Neurological: She is alert and oriented to person, place, and time.   Skin: Skin is warm, dry and intact. No rash noted. No cyanosis or erythema. No pallor.   Psychiatric: She has a normal mood and affect. Her behavior is normal. Thought content normal.   Vitals reviewed.      Lab and Diagnostic Review:    EKG: afib with RVR, LAFB, nonspecific ST abnormality,   View labs from 618 which showed a BNP of 248, glucose 134, sodium 136, potassium 4.3, BUN 39, creatinine 0.9, CO2 25, chloride 100, GFR 59, magnesium 2.1    Assessment and Plan:   1. Paroxysmal atrial fibrillation  - One dose of metoprolol 25mg (NDC 3531-9984-83) given in clinic today. HR dropped in the 90s. Will change carvedilol 12.5mg BID to metoprolol 75mg BID due to less hypotensive effect and hopefully better rate control.   - Atrial fibrillation education provided today including: causes of afib, s/s, risks for stroke and use of anticoagulation for stroke prevention and treatment of atrial fibrillation. Rhythm vs rate control discussed as well as medication management.  - ChadVasc score 6 on eliquis with no s/s of bleeding    2. Essential hypertension  - Controlled. Prone to hypotension. Orthostatic today. Monitored closely by nursing facility    3. Elevated BNP  - Mildly elevated, could be from afib. Weights stable. Only sign of FVO is edema which could be dependent. Patient on torsemide PRN for wt gain.     4. ADALGISA (obstructive sleep apnea)  - Wears CPAP    Orders written to the [facility] to call if HR is persistently >100 or if blood pressure drops. May need to consider digoxin if BP gets too low      It has been a pleasure to participate in the care of this patient.   Patient was instructed to call the [facility] with any questions, concerns, or worsening symptoms.    *Please note that portions of this note were completed with a voice recognition program. Efforts were made to edit the dictations, but occasionally words are mistranscribed.

## 2018-06-28 ENCOUNTER — OFFICE VISIT (OUTPATIENT)
Dept: CARDIOLOGY | Facility: HOSPITAL | Age: 83
End: 2018-06-28

## 2018-06-28 ENCOUNTER — HOSPITAL ENCOUNTER (OUTPATIENT)
Dept: CARDIOLOGY | Facility: HOSPITAL | Age: 83
Discharge: HOME | End: 2018-06-28
Admitting: NURSE PRACTITIONER

## 2018-06-28 VITALS
DIASTOLIC BLOOD PRESSURE: 58 MMHG | BODY MASS INDEX: 25.13 KG/M2 | OXYGEN SATURATION: 96 % | HEART RATE: 62 BPM | HEIGHT: 60 IN | SYSTOLIC BLOOD PRESSURE: 131 MMHG | TEMPERATURE: 97.1 F | WEIGHT: 128 LBS | RESPIRATION RATE: 16 BRPM

## 2018-06-28 DIAGNOSIS — I11.9 HYPERTENSIVE HEART DISEASE WITHOUT HEART FAILURE: ICD-10-CM

## 2018-06-28 DIAGNOSIS — I73.9 PERIPHERAL VASCULAR DISEASE (HCC): ICD-10-CM

## 2018-06-28 DIAGNOSIS — R60.0 LOCALIZED EDEMA: ICD-10-CM

## 2018-06-28 DIAGNOSIS — I48.0 PAROXYSMAL ATRIAL FIBRILLATION (HCC): Primary | ICD-10-CM

## 2018-06-28 DIAGNOSIS — I48.91 ATRIAL FIBRILLATION, UNSPECIFIED TYPE (HCC): ICD-10-CM

## 2018-06-28 PROCEDURE — 93005 ELECTROCARDIOGRAM TRACING: CPT | Performed by: NURSE PRACTITIONER

## 2018-06-28 PROCEDURE — 99214 OFFICE O/P EST MOD 30 MIN: CPT | Performed by: NURSE PRACTITIONER

## 2018-06-28 PROCEDURE — 93010 ELECTROCARDIOGRAM REPORT: CPT | Performed by: INTERNAL MEDICINE

## 2018-06-28 NOTE — PROGRESS NOTES
[facility]  Heart and Valve Center      Encounter Date:06/28/2018     Ute Glass [address] [facility] [address]  [phone]    4/7/1926    Jessica Alex MD    Ute Enrique is a 89 y.o. female.      Subjective:     Chief Complaint:  Atrial Fibrillation (1 week FU)       HPI     89-year-old female with a history of adrenal insufficiency on chronic steroids, paroxysmal atrial fibrillation, hypertension, ADALGISA, PVD, chronic kidney disease, hyperlipidemia, diabetes, vasovagal syncope, COPD.  Patient was last seen on 6/21/18 for rapid heart rate.  She was found to be in A. fib with RVR.  Carvedilol was increased the patient could not tolerate due to hypotension.  At that time complaining of worsening shortness of breath, fatigue, orthostatic dizziness.  Last office visit carvedilol was discontinued changing to metoprolol tartrate 75 mg twice a day for heart rate control.  Chads VASC 6 on Eliquis.  Patient was having mild lower extremity edema.  She takes torsemide as needed.  The history of sleep apnea wearing CPAP.    Pt reports less tachycardia.  No dyspnea, dizziness.  BP has been 100-142 at [facility] with HF 64-77.  States [facility] only held 1 dose of BB.  Fatigue has improved.  No weight gain.  No use of torsemide.  Pedal edema noted.      Patient Active Problem List    Diagnosis   • Atrial fibrillation [I48.91]   • Sepsis [A41.9]   • UTI (urinary tract infection) [N39.0]   • Malaise [R53.81]   • JANETT (acute kidney injury) [N17.9]   • Advanced age [R54]   • Diabetes mellitus [E11.9]     Overview Note:     Type 2     • Possible Influenza [J11.1]   • Former smoker [Z87.891]   • Hypertensive cardiovascular disease [I11.9]     Overview Note:     a. Remote history of dyspnea on exertion with reportedly normal echocardiogram and stress test - data deficit, 1990s.    b. Progressive dyspnea and fatigue with abnormal chest x-ray demonstrating lingular pneumonia with followup CT scan with contrast  revealing evidence of previous diffuse granulomatous infection and lingering pneumonia having resolved, June 2013, with subsequent reported normal PQ scan, June 2013.  c. Recurrent NYHA class III-IV dyspnea on exertion with EKG, July 2013 demonstrating leftward axis and possible old infarct with normal heart rate, PACs with acceptable echocardiogram echocardiographic GXT with excellent exercise tolerance and capacity (110% predicted) with normal oximetries and overall low probability for significant focal obstructive coronary disease with residual CCS class I chest pain syndrome NYHA class II exertional dyspnea and fatigue, August 2013.    d. Followup CT scan of the chest without contrast revealing evidence of previous diffuse granulomatous infection and noted lingular pneumonia, resolved since 06/15/2013 (06/26/2013).  e. Reportedly normal VQ scan ruling out pulmonary embolism - data deficit, 06/15/2013.  f. Recent NYHA class III-IV dyspnea on exertion with EKG demonstrating sinus rhythm with PACs, left axis deviation, and possible old infarct with ventricular rate of 75 BPM, 07/24/2013.  g. Residual CCS class I angina pectoris/NYHA class II exertional dyspnea and fatigue.       • Dyslipidemia [E78.5]   • ADALGISA (obstructive sleep apnea) [G47.33]     Overview Note:     with CPAP use     • Peripheral neuropathy [G62.9]   • Vitamin D deficiency [E55.9]   • Kidney disease [N28.9]     Overview Note:     chronic- stage 3     • Peripheral vascular disease [I73.9]     Overview Note:     h. Apparent probable arterial duplex demonstrating peripheral vascular disease with initiation of Plavix therapy 3-4 years ago.    i. No claudication symptoms.  No residual TIA or claudication symptoms.      • Osteoarthritis [M19.90]   • Osteopenia [M85.80]   • Corticoadrenal insufficiency [E27.40]     Overview Note:     on chronic steroids therapy     • COPD (chronic obstructive pulmonary disease) [J44.9]         Past Surgical History:    Procedure Laterality Date   • BREAST SURGERY Right 2007    Breast biopsy   • CATARACT EXTRACTION Bilateral        Allergies   Allergen Reactions   • Sulfa Antibiotics Anaphylaxis   • Bactrim [Sulfamethoxazole-Trimethoprim] Hives   • Keflex [Cephalexin] GI Intolerance   • Dilantin [Phenytoin Sodium Extended] Rash   • Fludrocortisone Acetate [Fludrocortisone] Palpitations     Hypertension  Edema     • Phenytoin Rash         Current Outpatient Prescriptions:   •  apixaban (ELIQUIS) 2.5 MG tablet tablet, Take 1 tablet by mouth Every 12 (Twelve) Hours., Disp: 60 tablet, Rfl: 5  •  busPIRone (BUSPAR) 10 MG tablet, Take 10 mg by mouth 2 (Two) Times a Day., Disp: , Rfl:   •  cholecalciferol (VITAMIN D3) 1000 units tablet, Take 1,000 Units by mouth Daily., Disp: , Rfl:   •  famotidine (PEPCID) 20 MG tablet, Take 20 mg by mouth Daily., Disp: , Rfl:   •  hydrocortisone (CORTEF) 10 MG tablet, Take 50 mg by mouth Daily., Disp: , Rfl:   •  metoprolol tartrate (LOPRESSOR) 25 MG tablet, Take 3 tablets by mouth 2 (Two) Times a Day., Disp: 180 tablet, Rfl: 3  •  mometasone-formoterol (DULERA 100) 100-5 MCG/ACT inhaler, Inhale 2 puffs 2 (Two) Times a Day., Disp: , Rfl:   •  montelukast (SINGULAIR) 10 MG tablet, Take 10 mg by mouth Every Night., Disp: , Rfl:   •  Multiple Vitamins-Minerals (MULTIVITAMIN WITH MINERALS) tablet tablet, Take 1 tablet by mouth Daily., Disp: , Rfl:   •  POLYETHYLENE GLYCOL 3350 PO, Take 17 g by mouth Every Night., Disp: , Rfl:   •  rosuvastatin (CRESTOR) 5 MG tablet, Take 5 mg by mouth Daily., Disp: , Rfl:   •  sitaGLIPtin (JANUVIA) 100 MG tablet, Take 100 mg by mouth daily., Disp: , Rfl:     The following portions of the patient's history were reviewed and updated as appropriate: allergies, current medications, past family history, past medical history, past social history, past surgical history and problem list.    Review of Systems   Constitution: Positive for weakness and malaise/fatigue. Negative for  "chills, decreased appetite, diaphoresis, fever, night sweats and weight gain.   HENT: Negative for nosebleeds.    Eyes: Negative for blurred vision, visual disturbance and visual halos.   Cardiovascular: Positive for dyspnea on exertion and palpitations. Negative for chest pain, claudication, cyanosis, leg swelling, near-syncope, orthopnea, paroxysmal nocturnal dyspnea and syncope.   Respiratory: Positive for cough and shortness of breath. Negative for hemoptysis, sleep disturbances due to breathing, snoring and wheezing.    Endocrine: Negative for polyphagia and polyuria.   Hematologic/Lymphatic: Bruises/bleeds easily.   Skin: Negative for dry skin, itching and rash.   Musculoskeletal: Positive for falls and muscle weakness. Negative for joint pain, joint swelling and myalgias.   Gastrointestinal: Positive for constipation. Negative for bloating, abdominal pain, diarrhea, dysphagia, heartburn, melena, nausea and vomiting.   Genitourinary: Negative for dysuria, flank pain, hematuria and nocturia.   Neurological: Negative for difficulty with concentration, excessive daytime sleepiness, dizziness, headaches and loss of balance.   Psychiatric/Behavioral: Negative for altered mental status and depression. The patient is nervous/anxious.    Allergic/Immunologic: Negative for environmental allergies.       Objective:     Vitals:    06/28/18 1434   BP: 131/58   BP Location: Right arm   Patient Position: Sitting   Pulse: 62   Resp: 16   Temp: 97.1 °F (36.2 °C)   TempSrc: Temporal Artery    SpO2: 96%   Weight: 58.1 kg (128 lb)   Height: 152.4 cm (60\")         Physical Exam   Constitutional: She is oriented to person, place, and time. She appears well-developed and well-nourished. No distress.   HENT:   Head: Normocephalic and atraumatic.   Mouth/Throat: Oropharynx is clear and moist.   Eyes: Conjunctivae are normal. Pupils are equal, round, and reactive to light. No scleral icterus.   Neck: No hepatojugular reflux and no JVD " present. Carotid bruit is not present. No tracheal deviation present. No thyromegaly present.   Cardiovascular: Normal heart sounds and intact distal pulses.  An irregularly irregular rhythm present. Tachycardia present.  Exam reveals no friction rub.    No murmur heard.  Pulmonary/Chest: Effort normal and breath sounds normal.   Abdominal: Soft. Bowel sounds are normal. She exhibits no distension. There is no tenderness.   Musculoskeletal: She exhibits edema (2+ of bilateral ankles).   Lymphadenopathy:     She has no cervical adenopathy.   Neurological: She is alert and oriented to person, place, and time.   Skin: Skin is warm, dry and intact. No rash noted. No cyanosis or erythema. No pallor.   Psychiatric: She has a normal mood and affect. Her behavior is normal. Thought content normal.   Vitals reviewed.      Lab and Diagnostic Review:  Lab Results   Component Value Date    WBC 7.48 12/21/2017    HGB 10.5 (L) 12/21/2017    HCT 33.3 (L) 12/21/2017    MCV 81.2 12/21/2017     12/21/2017     Lab Results   Component Value Date    GLUCOSE 139 (H) 12/21/2017    BUN 40 (H) 12/21/2017    CREATININE 1.10 12/21/2017    EGFRIFNONA 47 (L) 12/21/2017    BCR 36.4 (H) 12/21/2017    K 3.7 12/21/2017    CO2 29.0 12/21/2017    CALCIUM 9.1 12/21/2017    ALBUMIN 4.10 12/17/2017    LABIL2 1.6 12/17/2017    AST 25 12/17/2017    ALT 20 12/17/2017     Lab Results   Component Value Date    TSH 1.432 01/06/2016       Assessment and Plan:         1. Paroxysmal atrial fibrillation    - ECG 12 Lead; SR 73 bpm, with PAC  Continue metoprolol tartrate 75 mg BID    2. Peripheral vascular disease  With lower extremity wounds followed by wound care    3. Hypertensive heart disease without heart failure  stable    4. Localized edema  Encouraged to discuss compression stockings with [facility] to see if appropriate based on wound.    F/u with Dr. Walters as scheduled.  F/u [facility] 6 months or sooner if needed.        *Please note  that portions of this note were completed with a voice recognition program. Efforts were made to edit the dictations, but occasionally words are mistranscribed.

## 2018-08-24 ENCOUNTER — OFFICE VISIT (OUTPATIENT)
Dept: CARDIOLOGY | Facility: CLINIC | Age: 83
End: 2018-08-24

## 2018-08-24 VITALS
DIASTOLIC BLOOD PRESSURE: 81 MMHG | HEART RATE: 124 BPM | WEIGHT: 137 LBS | SYSTOLIC BLOOD PRESSURE: 100 MMHG | BODY MASS INDEX: 26.9 KG/M2 | HEIGHT: 60 IN

## 2018-08-24 DIAGNOSIS — I11.0 HYPERTENSIVE HEART DISEASE WITH HEART FAILURE (HCC): ICD-10-CM

## 2018-08-24 DIAGNOSIS — I48.20 CHRONIC ATRIAL FIBRILLATION (HCC): Primary | ICD-10-CM

## 2018-08-24 DIAGNOSIS — E78.5 DYSLIPIDEMIA: ICD-10-CM

## 2018-08-24 DIAGNOSIS — J44.9 CHRONIC OBSTRUCTIVE PULMONARY DISEASE, UNSPECIFIED COPD TYPE (HCC): ICD-10-CM

## 2018-08-24 DIAGNOSIS — G47.33 OSA (OBSTRUCTIVE SLEEP APNEA): ICD-10-CM

## 2018-08-24 PROCEDURE — 99214 OFFICE O/P EST MOD 30 MIN: CPT | Performed by: INTERNAL MEDICINE

## 2018-08-24 RX ORDER — RANITIDINE 150 MG/1
150 TABLET ORAL NIGHTLY
COMMUNITY

## 2018-08-24 RX ORDER — FUROSEMIDE 20 MG/1
20 TABLET ORAL AS NEEDED
COMMUNITY
End: 2018-08-24

## 2018-08-24 RX ORDER — SENNOSIDES 8.6 MG
TABLET ORAL NIGHTLY
COMMUNITY

## 2018-08-24 RX ORDER — TORSEMIDE 10 MG/1
10 TABLET ORAL EVERY MORNING
Qty: 180 TABLET | Refills: 3 | Status: SHIPPED | OUTPATIENT
Start: 2018-08-24

## 2018-08-24 RX ORDER — AZELASTINE 1 MG/ML
2 SPRAY, METERED NASAL 2 TIMES DAILY
COMMUNITY

## 2018-08-24 RX ORDER — ACETAMINOPHEN AND CODEINE PHOSPHATE 60; 300 MG/1; MG/1
1 TABLET ORAL EVERY 4 HOURS PRN
COMMUNITY

## 2018-08-24 RX ORDER — LOPERAMIDE HYDROCHLORIDE 2 MG/1
2 CAPSULE ORAL 4 TIMES DAILY PRN
COMMUNITY

## 2018-08-24 RX ORDER — UREA 10 %
3 LOTION (ML) TOPICAL
COMMUNITY

## 2018-08-24 RX ORDER — DILTIAZEM HYDROCHLORIDE 120 MG/1
120 CAPSULE, COATED, EXTENDED RELEASE ORAL EVERY MORNING
Qty: 90 CAPSULE | Refills: 3 | Status: SHIPPED | OUTPATIENT
Start: 2018-08-24

## 2018-08-24 RX ORDER — NEBIVOLOL 10 MG/1
10 TABLET ORAL EVERY MORNING
Qty: 90 TABLET | Refills: 3 | Status: SHIPPED | OUTPATIENT
Start: 2018-08-24

## 2018-08-24 RX ORDER — SENNOSIDES 8.6 MG
650 CAPSULE ORAL EVERY 8 HOURS PRN
COMMUNITY

## 2018-08-24 NOTE — PROGRESS NOTES
Subjective:     Encounter Date:08/24/2018    Patient ID: Ute Enrique is a 89 y.o. , white female, part-time professor of Behavioral Science at , from [place], [place], currently residing at [facility].       REFERRING INTERNIST:  Jessica Alex MD  PULMONOLOGIST:  Tito Fontenot MD, [facility]  NEPHROLOGIST:  [facility]     Chief Complaint:   Chief Complaint   Patient presents with   • Atrial Fibrillation     Problem List:  1. Probable hypertensive cardiovascular disease:  a. Remote history of dyspnea on exertion with reportedly normal echocardiogram and stress test - data deficit, 1990s.    b. Progressive dyspnea and fatigue with abnormal chest x-ray demonstrating lingular pneumonia with followup CT scan with contrast  revealing evidence of previous diffuse granulomatous infection and lingering pneumonia having resolved, June 2013, with subsequent reported normal PQ scan, June 2013.  c. Recurrent NYHA class III-IV dyspnea on exertion with EKG, July 2013 demonstrating  leftward axis and possible old infarct with normal heart rate, PACs with acceptable echocardiogram echocardiographic GXT with excellent exercise tolerance and capacity (110% predicted) with normal oximetries and overall low probability for significant  focal obstructive coronary disease with residual CCS class I chest pain syndrome NYHA class II exertional dyspnea and fatigue, August 2013.    d. Followup CT scan of the chest without contrast revealing evidence of previous diffuse granulomatous infection  and noted lingular pneumonia, resolved since 06/15/2013 (06/26/2013).  e. Reportedly normal VQ scan ruling out pulmonary embolism - data deficit, 06/15/2013.  f. Remote NYHA class III-IV dyspnea on exertion with EKG demonstrating sinus rhythm  with PACs, left axis deviation, and possible old infarct with ventricular rate of 75 BPM, 07/24/2013.  g. Residual CCS class I angina pectoris/NYHA class II  exertional dyspnea and fatigue.    2. Remote apparent atypical pneumonia, spring 2013:  a.  Diagnosed approximately 6 weeks ago with increasing shortness of breath, pleuritic chest pain, weakness and fatigue.   b. Two rounds of antibiotics with slow improvement, July 2013.  3. Remote tobacco abuse, resolved 1975.  4. Dyslipidemia.   5. ADALGISA with CPAP use.   6. Peripheral vascular disease:  a. Apparent probable arterial duplex demonstrating peripheral vascular disease with initiation of Plavix therapy 3-4 years ago.    b. No claudication symptoms.   No residual TIA or claudication symptoms.   7. Peripheral neuropathy.   8.  Vitamin D deficiency.   9. Stage 3 chronic kidney disease.    10. Osteoarthritis.   11. Osteopenia.   12. Hyperuricemia with intermittent gout.  13. GERD.   14. Corticoadrenal insufficiency, on chronic prednisone therapy.   15. History of seizures.   16. History  of vasovagal syncope with positive tilt table test, data deficit.   17. Type 2 pre-diabetes mellitus.    18. Possible chronic obstructive pulmonary disease.   19. [facility] hospitalization December 2017 for influenza, urinary tract infection, with new onset atrial fibrillation with RVR   20. Paroxysmal atrial fibrillation  a. Anticoagulated with Eliquis, CHADsVasc 5  b. Echocardiogram 12/19/17: LVEF 0.65, mild to moderate TR, LV cavity is small, LV wall thickness consistent with mild concentric hypertrophy, normal RV cavity size, wall thickness, systolic motion and septal motion noted.  No pericardial effusion.  No pulmonary hypertension.  Aortic valve exhibits sclerosis, LA volume borderline increased  c. External cardioversion 12/22/17, successful with a 100 J biphasic shock  d. Recurrent atrial fibrillation with rate control and anticoagulation felt warranted based on elevated CHADS2 VASc score, August 2018  21. [facility] hospitalization x48 hours, January 22-24, 2018, for severe dehydration, followed by a syncopal episode,  cultures negative for C. Difficile  22. Recurrent hospitalization at [facility] for fall and knee injury x3 days and for fall with pelvic fracture x3 days, June 2018 - data deficit  23. Remote operations:  a. Benign right breast biopsy, 2007.  b. Bilateral cataract removal.     Allergies   Allergen Reactions   • Sulfa Antibiotics Anaphylaxis   • Bactrim [Sulfamethoxazole-Trimethoprim] Hives   • Keflex [Cephalexin] GI Intolerance   • Dilantin [Phenytoin Sodium Extended] Rash   • Fludrocortisone Acetate [Fludrocortisone] Palpitations     Hypertension  Edema     • Phenytoin Rash         Current Outpatient Prescriptions:   •  acetaminophen (TYLENOL) 650 MG 8 hr tablet, Take 650 mg by mouth Every 8 (Eight) Hours As Needed for Mild Pain ., Disp: , Rfl:   •  apixaban (ELIQUIS) 2.5 MG tablet tablet, Take 1 tablet by mouth Every 12 (Twelve) Hours., Disp: 60 tablet, Rfl: 5  •  azelastine (ASTELIN) 0.1 % nasal spray, 2 sprays into the nostril(s) as directed by provider 2 (Two) Times a Day. Use in each nostril as directed, Disp: , Rfl:   •  busPIRone (BUSPAR) 10 MG tablet, Take 10 mg by mouth 2 (Two) Times a Day., Disp: , Rfl:   •  cholecalciferol (VITAMIN D3) 1000 units tablet, Take 1,000 Units by mouth Daily., Disp: , Rfl:   •  famotidine (PEPCID) 20 MG tablet, Take 20 mg by mouth Daily., Disp: , Rfl:   •  furosemide (LASIX) 20 MG tablet, Take 20 mg by mouth As Needed., Disp: , Rfl:   •  hydrocortisone (CORTEF) 10 MG tablet, Take 50 mg by mouth Daily., Disp: , Rfl:   •  linagliptin (TRADJENTA) 5 MG tablet tablet, Take 5 mg by mouth Daily., Disp: , Rfl:   •  loperamide (IMODIUM) 2 MG capsule, Take 2 mg by mouth 4 (Four) Times a Day As Needed for Diarrhea., Disp: , Rfl:   •  metoprolol tartrate (LOPRESSOR) 25 MG tablet, Take 3 tablets by mouth 2 (Two) Times a Day. (Patient taking differently: Take 50 mg by mouth 2 (Two) Times a Day.), Disp: 180 tablet, Rfl: 3  •  mometasone-formoterol (DULERA 100) 100-5 MCG/ACT  "inhaler, Inhale 2 puffs 2 (Two) Times a Day., Disp: , Rfl:   •  montelukast (SINGULAIR) 10 MG tablet, Take 10 mg by mouth Every Night., Disp: , Rfl:   •  Multiple Vitamins-Minerals (MULTIVITAMIN WITH MINERALS) tablet tablet, Take 1 tablet by mouth Daily., Disp: , Rfl:   •  POLYETHYLENE GLYCOL 3350 PO, Take 17 g by mouth Every Night., Disp: , Rfl:   •  raNITIdine (ZANTAC) 150 MG tablet, Take 150 mg by mouth Every Night., Disp: , Rfl:   •  rosuvastatin (CRESTOR) 5 MG tablet, Take 5 mg by mouth Daily., Disp: , Rfl:   •  senna (SENOKOT) 8.6 MG tablet tablet, Take  by mouth Every Night., Disp: , Rfl:   •  sertraline (ZOLOFT) 50 MG tablet, Take 50 mg by mouth Daily., Disp: , Rfl:   •  sitaGLIPtin (JANUVIA) 100 MG tablet, Take 100 mg by mouth daily., Disp: , Rfl:     HISTORY OF PRESENT ILLNESS: Patient returns for scheduled 6-month followup. She is in a wheelchair today and states that she has been at [facility] doing rehab since she hurt her knee when she fell while trying to get to her chair because she was feeling weak.  Then, in June 2018, she had a fall while at [facility] in the bathroom and fractured her pelvis and was admitted to [facility] for 3 days.  She does not believe she has been diagnosed with osteoporosis but is taking vitamin D.  She is doing OT and PT now at [facility], and this makes her very tired.  She walks with a walker during therapy, but not very far, and she otherwise is in a wheelchair.  She is advised that it is important for her to stay on anticoagulants to prevent a stroke due to her CHADS2 VASc score of 5.  The patient is aware of her heart racing at today's visit.  She is accompanied to the office today by a caregiver from [facility] and by a family member, presumably a son.  The patient states that she struggles with constipation and is currently taking MiraLAX prn and Linzess; however, she says the \"Linzess blows me away.\"  She is aware of tachypalpitations very frequently.  " "No laboratory studies are available to review.        Review of Systems   Cardiovascular: Positive for dyspnea on exertion, irregular heartbeat, leg swelling and palpitations (atrial fibrillation).      Obtained and otherwise negative except as outlined in problem list and HPI.    Procedures       Objective:       Vitals:    08/24/18 1440 08/24/18 1441   BP: 120/89 100/81   BP Location: Left arm Right arm   Patient Position: Sitting Sitting   Pulse: 116 (!) 124   Weight: 62.1 kg (137 lb) 62.1 kg (137 lb)   Height:  152.4 cm (60\")     Body mass index is 26.76 kg/m².   Last weight:  142 lbs.    Physical Exam   Constitutional: She is oriented to person, place, and time. She appears well-developed and well-nourished.   Neck: No JVD present. Carotid bruit is not present. No thyromegaly present.   Cardiovascular: S1 normal and S2 normal.  An irregularly irregular rhythm present. Tachycardia present.  Exam reveals no gallop, no S3 and no friction rub.    Murmur heard.   Medium-pitched early systolic murmur is present with a grade of 2/6  at the lower left sternal border  Pulses:       Carotid pulses are 1+ on the right side, and 1+ on the left side.       Radial pulses are 1+ on the right side, and 1+ on the left side.        Femoral pulses are 1+ on the right side, and 1+ on the left side.       Popliteal pulses are 1+ on the right side, and 1+ on the left side.        Dorsalis pedis pulses are 1+ on the right side, and 1+ on the left side.        Posterior tibial pulses are 1+ on the right side, and 1+ on the left side.   Pulmonary/Chest: Effort normal and breath sounds normal. She has no wheezes. She has no rhonchi. She has no rales.   Abdominal: Soft. She exhibits no mass. There is no hepatosplenomegaly. There is no tenderness. There is no guarding.   Bowel sounds audible x4   Musculoskeletal: Normal range of motion. She exhibits edema (1+ bipedal).   Lymphadenopathy:     She has no cervical adenopathy.   Neurological: " She is alert and oriented to person, place, and time.   Skin: Skin is warm, dry and intact. No rash noted.   Vitals reviewed.        Lab Review:   Lab Results   Component Value Date    GLUCOSE 139 (H) 12/21/2017    BUN 40 (H) 12/21/2017    CREATININE 1.10 12/21/2017    EGFRIFNONA 47 (L) 12/21/2017    BCR 36.4 (H) 12/21/2017    CO2 29.0 12/21/2017    CALCIUM 9.1 12/21/2017    ALBUMIN 4.10 12/17/2017    AST 25 12/17/2017    ALT 20 12/17/2017       Lab Results   Component Value Date    WBC 7.48 12/21/2017    HGB 10.5 (L) 12/21/2017    HCT 33.3 (L) 12/21/2017    MCV 81.2 12/21/2017     12/21/2017       Lab Results   Component Value Date    HGBA1C 6.50 (H) 12/18/2017       Lab Results   Component Value Date    TSH 1.432 01/06/2016       Lab Results   Component Value Date    .0 (H) 12/17/2017           Assessment:   Overall continued acceptable course with no interim cardiopulmonary complaints with fair functional status. We will defer additional diagnostic or therapeutic intervention from a cardiac perspective at this time other than to adjust her medications (see below).  The patient may need to consider VVIR pacemaker implant with AV node ablation.  Hopefully, we will be allowed to review any laboratory studies performed during rehabilitation and monitor with Dr. Pope.  Hopefully, BMP and CBC have been performed in the recent past.       Diagnosis Plan   1. Chronic atrial fibrillation (CMS/HCC)  See medication changes noted below   2. Hypertensive heart disease with heart failure (CMS/HCC)  See medication changes noted below   3. ADALGISA (obstructive sleep apnea)  Compliance stressed   4. Chronic obstructive pulmonary disease, unspecified COPD type (CMS/[facility])  Defer inhaled bronchodilators at this time   5. Dyslipidemia  No data to review          Plan:         1. Patient to continue current medications and close follow up with the above providers with the following alterations:   A. Alter furosemide to  torsemide 10 mg daily, with an extra 10 mg  prn swelling   B. Initiate Bystolic 10 mg daily   C. Discontinue metoprolol    D. Discontinue furosemide   E. Continue apixaban 2.5 mg bid   F. Add diltiazem  mg qpm prn heart rate greater than 85 BPM  2. Tentative cardiology follow up in October 2018, or patient may return sooner PRN.    Transcribed by Amber Shane for Dr. Erik Walters at 2:50 PM on 08/24/2018    I, Erik Walters MD, [facility], personally performed the services described in this documentation as scribed by the above named individual in my presence, and it is both accurate and complete. At 3:52 PM on 08/24/2018